# Patient Record
Sex: FEMALE | Employment: FULL TIME | URBAN - METROPOLITAN AREA
[De-identification: names, ages, dates, MRNs, and addresses within clinical notes are randomized per-mention and may not be internally consistent; named-entity substitution may affect disease eponyms.]

---

## 2017-04-07 ENCOUNTER — HOSPITAL ENCOUNTER (OUTPATIENT)
Dept: PULMONOLOGY | Age: 57
Discharge: HOME | End: 2017-04-07
Attending: ALLERGY & IMMUNOLOGY
Payer: COMMERCIAL

## 2017-04-07 VITALS — OXYGEN SATURATION: 97 %

## 2017-04-07 DIAGNOSIS — R06.2 WHEEZING: ICD-10-CM

## 2017-04-07 DIAGNOSIS — R05.9 COUGH: ICD-10-CM

## 2017-04-07 PROCEDURE — 94640 AIRWAY INHALATION TREATMENT: CPT

## 2017-04-07 PROCEDURE — 94070 EVALUATION OF WHEEZING: CPT

## 2017-04-07 PROCEDURE — 74011000250 HC RX REV CODE- 250

## 2017-04-07 PROCEDURE — 74011250636 HC RX REV CODE- 250/636: Performed by: FAMILY MEDICINE

## 2017-04-07 PROCEDURE — 74011000250 HC RX REV CODE- 250: Performed by: FAMILY MEDICINE

## 2017-04-07 RX ORDER — ALBUTEROL SULFATE 0.83 MG/ML
SOLUTION RESPIRATORY (INHALATION)
Status: COMPLETED
Start: 2017-04-07 | End: 2017-04-07

## 2017-04-07 RX ORDER — ALBUTEROL SULFATE 0.83 MG/ML
2.5 SOLUTION RESPIRATORY (INHALATION) ONCE
Status: COMPLETED | OUTPATIENT
Start: 2017-04-07 | End: 2017-04-07

## 2017-04-07 RX ADMIN — METHACHOLINE CHLORIDE 0.03 MG: 100 POWDER, FOR SOLUTION RESPIRATORY (INHALATION) at 14:49

## 2017-04-07 RX ADMIN — ALBUTEROL SULFATE 2.5 MG: 2.5 SOLUTION RESPIRATORY (INHALATION) at 15:13

## 2017-04-07 RX ADMIN — METHACHOLINE CHLORIDE 2.5 MG: 100 POWDER, FOR SOLUTION RESPIRATORY (INHALATION) at 14:58

## 2017-04-07 RX ADMIN — METHACHOLINE CHLORIDE 0.25 MG: 100 POWDER, FOR SOLUTION RESPIRATORY (INHALATION) at 14:53

## 2017-04-07 RX ADMIN — METHACHOLINE CHLORIDE 25 MG: 100 POWDER, FOR SOLUTION RESPIRATORY (INHALATION) at 15:11

## 2017-04-07 RX ADMIN — METHACHOLINE CHLORIDE 10 MG: 100 POWDER, FOR SOLUTION RESPIRATORY (INHALATION) at 15:02

## 2017-04-12 NOTE — PROCEDURES
[address]   [address], [address]   PULMONARY FUNCTION       Name:  Mich Stafnord   MR#:  [id number]   :  1960   Account #:  [de-identified]    Date of Procedure:  2017   Date of Adm:  2017       STUDY: Methacholine Challenge. REQUESTING PHYSICIAN: Dr. Huyen López. INDICATION: Asthma. FINDINGS: The patient received serial spirometry measurements after   receiving 5 successive escalating doses of methacholine. By ATS   criteria vital capacity and FEV1 did not increase by more than 20%. In   some aspects this could be considered a negative test and others it   could be considered borderline. Clinical correlation is recommended.          MD Ivan Greenwood / Jodie Shirley   D:  2017   16:04   T:  2017   23:01   Job #:  [id number]

## 2018-04-10 ENCOUNTER — HOSPITAL ENCOUNTER (OUTPATIENT)
Dept: CT IMAGING | Age: 58
Discharge: HOME | End: 2018-04-10
Attending: FAMILY MEDICINE
Payer: COMMERCIAL

## 2018-04-10 DIAGNOSIS — E27.9 ADRENAL HYPERTENSION (HCC): ICD-10-CM

## 2018-04-10 DIAGNOSIS — I15.2 ADRENAL HYPERTENSION (HCC): ICD-10-CM

## 2018-04-10 PROCEDURE — 74170 CT ABD WO CNTRST FLWD CNTRST: CPT

## 2018-04-10 PROCEDURE — 74011636320 HC RX REV CODE- 636/320: Performed by: RADIOLOGY

## 2018-04-10 RX ADMIN — IOPAMIDOL 95 ML: 755 INJECTION, SOLUTION INTRAVENOUS at 09:03

## 2020-07-27 ENCOUNTER — HOSPITAL ENCOUNTER (OUTPATIENT)
Dept: PREADMISSION TESTING | Age: 60
Discharge: HOME | End: 2020-07-27
Payer: COMMERCIAL

## 2020-07-27 VITALS
DIASTOLIC BLOOD PRESSURE: 95 MMHG | BODY MASS INDEX: 31.04 KG/M2 | HEIGHT: 67 IN | OXYGEN SATURATION: 97 % | SYSTOLIC BLOOD PRESSURE: 145 MMHG | WEIGHT: 197.75 LBS | RESPIRATION RATE: 20 BRPM | TEMPERATURE: 98.1 F | HEART RATE: 93 BPM

## 2020-07-27 LAB
25(OH)D3 SERPL-MCNC: 26.7 NG/ML (ref 30–100)
ABO + RH BLD: NORMAL
ALBUMIN SERPL-MCNC: 4 G/DL (ref 3.5–5)
ALBUMIN/GLOB SERPL: 1 {RATIO} (ref 1.1–2.2)
ALP SERPL-CCNC: 170 U/L (ref 45–117)
ALT SERPL-CCNC: 95 U/L (ref 12–78)
ANION GAP SERPL CALC-SCNC: 8 MMOL/L (ref 5–15)
APPEARANCE UR: CLEAR
APTT PPP: 28.2 SEC (ref 22.1–32)
AST SERPL-CCNC: 99 U/L (ref 15–37)
BACTERIA URNS QL MICRO: NEGATIVE /HPF
BASOPHILS # BLD: 0 K/UL (ref 0–0.1)
BASOPHILS NFR BLD: 0 % (ref 0–1)
BILIRUB SERPL-MCNC: 0.5 MG/DL (ref 0.2–1)
BILIRUB UR QL: NEGATIVE
BLOOD GROUP ANTIBODIES SERPL: NORMAL
BUN SERPL-MCNC: 5 MG/DL (ref 6–20)
BUN/CREAT SERPL: 8 (ref 12–20)
CALCIUM SERPL-MCNC: 9.1 MG/DL (ref 8.5–10.1)
CHLORIDE SERPL-SCNC: 93 MMOL/L (ref 97–108)
CO2 SERPL-SCNC: 29 MMOL/L (ref 21–32)
COLOR UR: NORMAL
CREAT SERPL-MCNC: 0.63 MG/DL (ref 0.55–1.02)
DIFFERENTIAL METHOD BLD: ABNORMAL
EOSINOPHIL # BLD: 0.1 K/UL (ref 0–0.4)
EOSINOPHIL NFR BLD: 1 % (ref 0–7)
EPITH CASTS URNS QL MICRO: NORMAL /LPF
ERYTHROCYTE [DISTWIDTH] IN BLOOD BY AUTOMATED COUNT: 11.8 % (ref 11.5–14.5)
EST. AVERAGE GLUCOSE BLD GHB EST-MCNC: 103 MG/DL
GLOBULIN SER CALC-MCNC: 3.9 G/DL (ref 2–4)
GLUCOSE SERPL-MCNC: 81 MG/DL (ref 65–100)
GLUCOSE UR STRIP.AUTO-MCNC: NEGATIVE MG/DL
HBA1C MFR BLD: 5.2 % (ref 4–5.6)
HCT VFR BLD AUTO: 38 % (ref 35–47)
HGB BLD-MCNC: 13.3 G/DL (ref 11.5–16)
HGB UR QL STRIP: NEGATIVE
HYALINE CASTS URNS QL MICRO: NORMAL /LPF (ref 0–5)
IMM GRANULOCYTES # BLD AUTO: 0 K/UL (ref 0–0.04)
IMM GRANULOCYTES NFR BLD AUTO: 0 % (ref 0–0.5)
INR PPP: 1 (ref 0.9–1.1)
KETONES UR QL STRIP.AUTO: NEGATIVE MG/DL
LEUKOCYTE ESTERASE UR QL STRIP.AUTO: NEGATIVE
LYMPHOCYTES # BLD: 1.7 K/UL (ref 0.8–3.5)
LYMPHOCYTES NFR BLD: 17 % (ref 12–49)
MCH RBC QN AUTO: 35.7 PG (ref 26–34)
MCHC RBC AUTO-ENTMCNC: 35 G/DL (ref 30–36.5)
MCV RBC AUTO: 101.9 FL (ref 80–99)
MONOCYTES # BLD: 0.8 K/UL (ref 0–1)
MONOCYTES NFR BLD: 8 % (ref 5–13)
NEUTS SEG # BLD: 7.4 K/UL (ref 1.8–8)
NEUTS SEG NFR BLD: 74 % (ref 32–75)
NITRITE UR QL STRIP.AUTO: NEGATIVE
NRBC # BLD: 0 K/UL (ref 0–0.01)
NRBC BLD-RTO: 0 PER 100 WBC
PH UR STRIP: 7.5 [PH] (ref 5–8)
PLATELET # BLD AUTO: 249 K/UL (ref 150–400)
PMV BLD AUTO: 9 FL (ref 8.9–12.9)
POTASSIUM SERPL-SCNC: 3.5 MMOL/L (ref 3.5–5.1)
PROT SERPL-MCNC: 7.9 G/DL (ref 6.4–8.2)
PROT UR STRIP-MCNC: NEGATIVE MG/DL
PROTHROMBIN TIME: 10.7 SEC (ref 9–11.1)
RBC # BLD AUTO: 3.73 M/UL (ref 3.8–5.2)
RBC #/AREA URNS HPF: NORMAL /HPF (ref 0–5)
SODIUM SERPL-SCNC: 130 MMOL/L (ref 136–145)
SP GR UR REFRACTOMETRY: 1.01 (ref 1–1.03)
SPECIMEN EXP DATE BLD: NORMAL
THERAPEUTIC RANGE,PTTT: NORMAL SECS (ref 58–77)
UA: UC IF INDICATED,UAUC: NORMAL
UROBILINOGEN UR QL STRIP.AUTO: 0.2 EU/DL (ref 0.2–1)
WBC # BLD AUTO: 10 K/UL (ref 3.6–11)
WBC URNS QL MICRO: NORMAL /HPF (ref 0–4)

## 2020-07-27 PROCEDURE — 85610 PROTHROMBIN TIME: CPT

## 2020-07-27 PROCEDURE — 80053 COMPREHEN METABOLIC PANEL: CPT

## 2020-07-27 PROCEDURE — 36415 COLL VENOUS BLD VENIPUNCTURE: CPT

## 2020-07-27 PROCEDURE — 86900 BLOOD TYPING SEROLOGIC ABO: CPT

## 2020-07-27 PROCEDURE — 85730 THROMBOPLASTIN TIME PARTIAL: CPT

## 2020-07-27 PROCEDURE — 93005 ELECTROCARDIOGRAM TRACING: CPT

## 2020-07-27 PROCEDURE — 82306 VITAMIN D 25 HYDROXY: CPT

## 2020-07-27 PROCEDURE — 85025 COMPLETE CBC W/AUTO DIFF WBC: CPT

## 2020-07-27 PROCEDURE — 83036 HEMOGLOBIN GLYCOSYLATED A1C: CPT

## 2020-07-27 PROCEDURE — 81001 URINALYSIS AUTO W/SCOPE: CPT

## 2020-07-27 RX ORDER — BUPROPION HYDROCHLORIDE 300 MG/1
300 TABLET ORAL
COMMUNITY

## 2020-07-27 RX ORDER — CITALOPRAM 40 MG/1
40 TABLET, FILM COATED ORAL
COMMUNITY

## 2020-07-27 RX ORDER — CYCLOBENZAPRINE HCL 10 MG
10 TABLET ORAL
Status: ON HOLD | COMMUNITY
End: 2020-08-04 | Stop reason: SDUPTHER

## 2020-07-27 RX ORDER — OMEPRAZOLE 20 MG/1
20 CAPSULE, DELAYED RELEASE ORAL
COMMUNITY
End: 2020-08-17

## 2020-07-27 RX ORDER — BISMUTH SUBSALICYLATE 262 MG
1 TABLET,CHEWABLE ORAL
COMMUNITY

## 2020-07-27 RX ORDER — LAMOTRIGINE 100 MG/1
100 TABLET ORAL
COMMUNITY

## 2020-07-27 RX ORDER — SIMVASTATIN 40 MG/1
40 TABLET, FILM COATED ORAL
COMMUNITY

## 2020-07-27 RX ORDER — CELECOXIB 200 MG/1
200 CAPSULE ORAL DAILY
COMMUNITY
End: 2020-08-04

## 2020-07-27 RX ORDER — NEBIVOLOL 5 MG/1
5 TABLET ORAL
COMMUNITY

## 2020-07-27 RX ORDER — LISINOPRIL AND HYDROCHLOROTHIAZIDE 20; 25 MG/1; MG/1
1 TABLET ORAL
COMMUNITY
End: 2020-08-18

## 2020-07-27 NOTE — PERIOP NOTES
[address] , [address] [facility]   MAIN OR                                  [phone]   MAIN PRE OP                          [phone]                                                                                AMBULATORY PRE OP          [phone]  PRE-ADMISSION TESTING    [phone]   Surgery Date:  Monday 8/3/20       Is surgery arrival time given by surgeon? NO  If NO, [facility] staff will call you between 3 and 7pm the day before your surgery with your arrival time. (If your surgery is on a Monday, we will call you the Friday before.)    Call [phone] after 7pm Monday-Friday if you did not receive this call. INSTRUCTIONS BEFORE YOUR SURGERY   When You  Arrive Arrive at the 2nd [address] [facility] on the day of your surgery  Have your insurance card, photo ID, and any copayment (if needed)   Food   and   Drink NO food or drink after midnight the night before surgery    This means NO water, gum, mints, coffee, juice, etc.  No alcohol (beer, wine, liquor) 24 hours before and after surgery   Medications to   TAKE   Morning of Surgery MEDICATIONS TO TAKE THE MORNING OF SURGERY WITH A SIP OF WATER:    Bupropion, citalopram, lamictal   Medications  To  STOP      7 days before surgery  Non-Steroidal anti-inflammatory Drugs (NSAID's): for example, Ibuprofen (Advil, Motrin), Naproxen (Aleve)   Aspirin, if taking for pain    Herbal supplements, vitamins, and fish oil   Other:  (Pain medications not listed above, including Tylenol may be taken)   Blood  Thinners     Bathing Clothing  Jewelry  Valuables      If you shower the morning of surgery, please do not apply anything to your skin (lotions, powders, deodorant, or makeup, especially mascara)   Follow Chlorhexidine Care Fusion body wash instructions provided to you during PAT appointment. Begin 3 days prior to surgery.    Do not shave or trim anywhere 24 hours before surgery   Wear your hair loose or down; no pony-tails, buns, or metal hair clips   Wear loose, comfortable, clean clothes   Wear glasses instead of contacts   Leave money, valuables, and jewelry, including body piercings, at home   Going Home - or Spending the Night  SAME-DAY SURGERY: You must have a responsible adult drive you home and stay with you 24 hours after surgery   ADMITS: If your doctor is keeping you in the hospital after surgery, leave personal belongings/luggage in your car until you have a hospital room number. Hospital discharge time is 12 noon  Drivers must be here before 12 noon unless you are told differently   Special Instructions It is now mandated that all surgical patients be tested for COVID-19 prior to surgery. Testing has to be exactly 4 days prior to surgery. Your COVID test date is Thursday 7/30/20 between 8:00 am and 11:00 am.       COVID testing will be performed curbside at the [address] [place] [address] Dr armstrong. There will be signs leading you to the testing site. You will need to bring a photo ID with you to be swabbed. Patients are advised to self-quarantine at home after testing and prior to your surgery date. You will be notified if your results are positive. What to watch for:   Coronavirus (COVID-19) affects different people in different ways   It also appears with a wide range of symptoms from mild to severe   Signs usually appear 2-14 days after exposure     If you develop any of the following, notify your doctor immediately:  o Fever  o Chills, with or without a shiver  o Muscle pain  o Headache  o Sore throat  o Dry cough  o New loss of taste or smell  o Tiredness      If you develop any of the following, call 911:  o Shortness of breath  o Difficulty breathing  o Chest pain  o New confusion  o Blueness of fingers and/or lips    16.   Special Instructions:  · Use Chlorhexidine Care Fusion wash and sponges 3 days prior to surgery as instructed. · Incentive spirometer given with instructions to practice at home and bring back to the hospital on the day of surgery. · [facility] will contact you if your Hemoglobin A1C is greater than 7.5. · Ensure/Glucerna  sample  and Ensure/Glucerna coupon given. · Pain pamphlet and Call Don't Fall reminder reviewed with patient. ·   · Bring PTA Medication list day of surgery with the last doses taken documented   · Do not bring medication bottles the day of surgery     Follow all instructions so your surgery wont be cancelled. Please, be on time. If a situation occurs and you are delayed the day of surgery, call [phone] or [phone]. If your physical condition changes (like a fever, cold, flu, etc.) call your surgeon. Home medication(s) reviewed and verified via      LIST   VERBAL   during PAT appointment. The patient was contacted by      IN-PERSON  The patient verbalizes understanding of all instructions and      DOES NOT   need reinforcement.

## 2020-07-28 LAB
BACTERIA SPEC CULT: NORMAL
BACTERIA SPEC CULT: NORMAL
SERVICE CMNT-IMP: NORMAL

## 2020-07-28 RX ORDER — CHOLECALCIFEROL TAB 125 MCG (5000 UNIT) 125 MCG
5000 TAB ORAL DAILY
Qty: 30 TAB | Refills: 0 | Status: SHIPPED | OUTPATIENT
Start: 2020-07-28 | End: 2020-08-27
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
## 2020-07-29 LAB
ATRIAL RATE: 92 BPM
CALCULATED P AXIS, ECG09: 37 DEGREES
CALCULATED R AXIS, ECG10: -3 DEGREES
CALCULATED T AXIS, ECG11: 20 DEGREES
DIAGNOSIS, 93000: NORMAL
P-R INTERVAL, ECG05: 158 MS
Q-T INTERVAL, ECG07: 392 MS
QRS DURATION, ECG06: 92 MS
QTC CALCULATION (BEZET), ECG08: 484 MS
VENTRICULAR RATE, ECG03: 92 BPM

## 2020-07-30 ENCOUNTER — HOSPITAL ENCOUNTER (OUTPATIENT)
Dept: PREADMISSION TESTING | Age: 60
Discharge: HOME | End: 2020-07-30
Payer: COMMERCIAL

## 2020-07-30 PROCEDURE — 87635 SARS-COV-2 COVID-19 AMP PRB: CPT

## 2020-07-31 ENCOUNTER — ANESTHESIA EVENT (OUTPATIENT)
Dept: SURGERY | Age: 60
End: 2020-07-31
Payer: COMMERCIAL

## 2020-07-31 LAB — SARS-COV-2, COV2NT: NOT DETECTED

## 2020-08-03 ENCOUNTER — HOSPITAL ENCOUNTER (OUTPATIENT)
Age: 60
Discharge: HOME | End: 2020-08-04
Attending: ORTHOPAEDIC SURGERY | Admitting: ORTHOPAEDIC SURGERY
Payer: COMMERCIAL

## 2020-08-03 ENCOUNTER — ANESTHESIA (OUTPATIENT)
Dept: SURGERY | Age: 60
End: 2020-08-03
Payer: COMMERCIAL

## 2020-08-03 ENCOUNTER — APPOINTMENT (OUTPATIENT)
Dept: GENERAL RADIOLOGY | Age: 60
End: 2020-08-03
Attending: ORTHOPAEDIC SURGERY
Payer: COMMERCIAL

## 2020-08-03 DIAGNOSIS — G89.18 POST-OP PAIN: Primary | ICD-10-CM

## 2020-08-03 PROBLEM — M48.02 CERVICAL STENOSIS OF SPINAL CANAL: Status: ACTIVE | Noted: 2020-08-03

## 2020-08-03 PROCEDURE — 74011250637 HC RX REV CODE- 250/637: Performed by: ORTHOPAEDIC SURGERY

## 2020-08-03 PROCEDURE — 77030011640 HC PAD GRND REM COVD -A: Performed by: ORTHOPAEDIC SURGERY

## 2020-08-03 PROCEDURE — 2709999900 HC NON-CHARGEABLE SUPPLY: Performed by: ORTHOPAEDIC SURGERY

## 2020-08-03 PROCEDURE — 77030040356 HC CORD BPLR FRCP COVD -A: Performed by: ORTHOPAEDIC SURGERY

## 2020-08-03 PROCEDURE — 77030030102 HC BIT DRL PYRNES K2M -B: Performed by: ORTHOPAEDIC SURGERY

## 2020-08-03 PROCEDURE — 74011250636 HC RX REV CODE- 250/636: Performed by: ORTHOPAEDIC SURGERY

## 2020-08-03 PROCEDURE — 77030018836 HC SOL IRR NACL ICUM -A: Performed by: ORTHOPAEDIC SURGERY

## 2020-08-03 PROCEDURE — 74011250636 HC RX REV CODE- 250/636: Performed by: ANESTHESIOLOGY

## 2020-08-03 PROCEDURE — 77030008684 HC TU ET CUF COVD -B: Performed by: ANESTHESIOLOGY

## 2020-08-03 PROCEDURE — 77030003666 HC NDL SPINAL BD -A: Performed by: ORTHOPAEDIC SURGERY

## 2020-08-03 PROCEDURE — 77030026438 HC STYL ET INTUB CARD -A: Performed by: ANESTHESIOLOGY

## 2020-08-03 PROCEDURE — 77030011267 HC ELECTRD BLD COVD -A: Performed by: ORTHOPAEDIC SURGERY

## 2020-08-03 PROCEDURE — 74011250636 HC RX REV CODE- 250/636: Performed by: NURSE ANESTHETIST, CERTIFIED REGISTERED

## 2020-08-03 PROCEDURE — 77030038692 HC WND DEB SYS IRMX -B: Performed by: ORTHOPAEDIC SURGERY

## 2020-08-03 PROCEDURE — 77030004391 HC BUR FLUT MEDT -C: Performed by: ORTHOPAEDIC SURGERY

## 2020-08-03 PROCEDURE — 76010000171 HC OR TIME 2 TO 2.5 HR INTENSV-TIER 1: Performed by: ORTHOPAEDIC SURGERY

## 2020-08-03 PROCEDURE — 99218 HC RM OBSERVATION: CPT

## 2020-08-03 PROCEDURE — 77030028271 HC SRGFL HEMSTAT MTRX KT J&J -C: Performed by: ORTHOPAEDIC SURGERY

## 2020-08-03 PROCEDURE — 76060000035 HC ANESTHESIA 2 TO 2.5 HR: Performed by: ORTHOPAEDIC SURGERY

## 2020-08-03 PROCEDURE — 77030031139 HC SUT VCRL2 J&J -A: Performed by: ORTHOPAEDIC SURGERY

## 2020-08-03 PROCEDURE — 77030037302 HC SPCR CERV LORDTC INLC -G: Performed by: ORTHOPAEDIC SURGERY

## 2020-08-03 PROCEDURE — 74011000250 HC RX REV CODE- 250: Performed by: NURSE ANESTHETIST, CERTIFIED REGISTERED

## 2020-08-03 PROCEDURE — 77030038552 HC DRN WND MDII -A: Performed by: ORTHOPAEDIC SURGERY

## 2020-08-03 PROCEDURE — 77030029099 HC BN WAX SSPC -A: Performed by: ORTHOPAEDIC SURGERY

## 2020-08-03 PROCEDURE — 77030040361 HC SLV COMPR DVT MDII -B

## 2020-08-03 PROCEDURE — C1713 ANCHOR/SCREW BN/BN,TIS/BN: HCPCS | Performed by: ORTHOPAEDIC SURGERY

## 2020-08-03 PROCEDURE — 77030013079 HC BLNKT BAIR HGGR 3M -A: Performed by: ANESTHESIOLOGY

## 2020-08-03 PROCEDURE — 74011000250 HC RX REV CODE- 250: Performed by: ORTHOPAEDIC SURGERY

## 2020-08-03 PROCEDURE — 76210000000 HC OR PH I REC 2 TO 2.5 HR: Performed by: ORTHOPAEDIC SURGERY

## 2020-08-03 PROCEDURE — 77030040922 HC BLNKT HYPOTHRM STRY -A

## 2020-08-03 PROCEDURE — 77030002933 HC SUT MCRYL J&J -A: Performed by: ORTHOPAEDIC SURGERY

## 2020-08-03 PROCEDURE — 77030018846 HC SOL IRR STRL H20 ICUM -A: Performed by: ORTHOPAEDIC SURGERY

## 2020-08-03 DEVICE — SPACER SPNL L15XW13XH9MM ANT CERV INTBDY FUS LORD CAPISTRANO: Type: IMPLANTABLE DEVICE | Site: SPINE CERVICAL | Status: FUNCTIONAL

## 2020-08-03 DEVICE — PLATE SPNL L36MM BILAT ANTR CERV TI 2 LEV CONSTRN LO PROF: Type: IMPLANTABLE DEVICE | Site: SPINE CERVICAL | Status: FUNCTIONAL

## 2020-08-03 DEVICE — SCREW SPNL L14MM DIA4MM CERV ST CONSTRN PYRENEES: Type: IMPLANTABLE DEVICE | Site: SPINE CERVICAL | Status: FUNCTIONAL

## 2020-08-03 DEVICE — IMPLANTABLE DEVICE: Type: IMPLANTABLE DEVICE | Site: SPINE CERVICAL | Status: FUNCTIONAL

## 2020-08-03 DEVICE — SCREW SPNL L12MM DIA4MM CERV ST CONSTRN LO PROF TIFIX LCK: Type: IMPLANTABLE DEVICE | Site: SPINE CERVICAL | Status: FUNCTIONAL

## 2020-08-03 RX ORDER — KETAMINE HYDROCHLORIDE 10 MG/ML
INJECTION, SOLUTION INTRAMUSCULAR; INTRAVENOUS AS NEEDED
Status: DISCONTINUED | OUTPATIENT
Start: 2020-08-03 | End: 2020-08-03 | Stop reason: HOSPADM

## 2020-08-03 RX ORDER — ACETAMINOPHEN 325 MG/1
650 TABLET ORAL
Status: DISCONTINUED | OUTPATIENT
Start: 2020-08-03 | End: 2020-08-04 | Stop reason: HOSPADM

## 2020-08-03 RX ORDER — ATORVASTATIN CALCIUM 20 MG/1
20 TABLET, FILM COATED ORAL DAILY
Status: DISCONTINUED | OUTPATIENT
Start: 2020-08-03 | End: 2020-08-04 | Stop reason: HOSPADM

## 2020-08-03 RX ORDER — DEXAMETHASONE SODIUM PHOSPHATE 4 MG/ML
INJECTION, SOLUTION INTRA-ARTICULAR; INTRALESIONAL; INTRAMUSCULAR; INTRAVENOUS; SOFT TISSUE AS NEEDED
Status: DISCONTINUED | OUTPATIENT
Start: 2020-08-03 | End: 2020-08-03 | Stop reason: HOSPADM

## 2020-08-03 RX ORDER — HYDROMORPHONE HCL/0.9% NACL/PF 0.5 MG/ML
PLASTIC BAG, INJECTION (ML) INTRAVENOUS
Status: DISCONTINUED | OUTPATIENT
Start: 2020-08-03 | End: 2020-08-04 | Stop reason: HOSPADM

## 2020-08-03 RX ORDER — OXYCODONE HYDROCHLORIDE 5 MG/1
5 TABLET ORAL
Status: DISCONTINUED | OUTPATIENT
Start: 2020-08-03 | End: 2020-08-04 | Stop reason: HOSPADM

## 2020-08-03 RX ORDER — HYDROMORPHONE HYDROCHLORIDE 2 MG/ML
INJECTION, SOLUTION INTRAMUSCULAR; INTRAVENOUS; SUBCUTANEOUS AS NEEDED
Status: DISCONTINUED | OUTPATIENT
Start: 2020-08-03 | End: 2020-08-03 | Stop reason: HOSPADM

## 2020-08-03 RX ORDER — POLYETHYLENE GLYCOL 3350 17 G/17G
17 POWDER, FOR SOLUTION ORAL DAILY
Status: DISCONTINUED | OUTPATIENT
Start: 2020-08-04 | End: 2020-08-04 | Stop reason: HOSPADM

## 2020-08-03 RX ORDER — PROPOFOL 10 MG/ML
INJECTION, EMULSION INTRAVENOUS
Status: DISCONTINUED | OUTPATIENT
Start: 2020-08-03 | End: 2020-08-03 | Stop reason: HOSPADM

## 2020-08-03 RX ORDER — PROPOFOL 10 MG/ML
INJECTION, EMULSION INTRAVENOUS AS NEEDED
Status: DISCONTINUED | OUTPATIENT
Start: 2020-08-03 | End: 2020-08-03 | Stop reason: HOSPADM

## 2020-08-03 RX ORDER — LAMOTRIGINE 100 MG/1
100 TABLET ORAL DAILY
Status: DISCONTINUED | OUTPATIENT
Start: 2020-08-04 | End: 2020-08-04 | Stop reason: HOSPADM

## 2020-08-03 RX ORDER — SUCCINYLCHOLINE CHLORIDE 20 MG/ML
INJECTION INTRAMUSCULAR; INTRAVENOUS AS NEEDED
Status: DISCONTINUED | OUTPATIENT
Start: 2020-08-03 | End: 2020-08-03 | Stop reason: HOSPADM

## 2020-08-03 RX ORDER — MIDAZOLAM HYDROCHLORIDE 1 MG/ML
INJECTION, SOLUTION INTRAMUSCULAR; INTRAVENOUS AS NEEDED
Status: DISCONTINUED | OUTPATIENT
Start: 2020-08-03 | End: 2020-08-03 | Stop reason: HOSPADM

## 2020-08-03 RX ORDER — PANTOPRAZOLE SODIUM 40 MG/1
40 TABLET, DELAYED RELEASE ORAL
Status: DISCONTINUED | OUTPATIENT
Start: 2020-08-04 | End: 2020-08-04 | Stop reason: HOSPADM

## 2020-08-03 RX ORDER — PHENYLEPHRINE HCL IN 0.9% NACL 0.4MG/10ML
SYRINGE (ML) INTRAVENOUS AS NEEDED
Status: DISCONTINUED | OUTPATIENT
Start: 2020-08-03 | End: 2020-08-03 | Stop reason: HOSPADM

## 2020-08-03 RX ORDER — SODIUM CHLORIDE 0.9 % (FLUSH) 0.9 %
5-40 SYRINGE (ML) INJECTION AS NEEDED
Status: DISCONTINUED | OUTPATIENT
Start: 2020-08-03 | End: 2020-08-04 | Stop reason: HOSPADM

## 2020-08-03 RX ORDER — ONDANSETRON 2 MG/ML
INJECTION INTRAMUSCULAR; INTRAVENOUS AS NEEDED
Status: DISCONTINUED | OUTPATIENT
Start: 2020-08-03 | End: 2020-08-03 | Stop reason: HOSPADM

## 2020-08-03 RX ORDER — CITALOPRAM 20 MG/1
40 TABLET, FILM COATED ORAL DAILY
Status: DISCONTINUED | OUTPATIENT
Start: 2020-08-04 | End: 2020-08-04 | Stop reason: HOSPADM

## 2020-08-03 RX ORDER — LIDOCAINE HYDROCHLORIDE 20 MG/ML
INJECTION, SOLUTION EPIDURAL; INFILTRATION; INTRACAUDAL; PERINEURAL AS NEEDED
Status: DISCONTINUED | OUTPATIENT
Start: 2020-08-03 | End: 2020-08-03 | Stop reason: HOSPADM

## 2020-08-03 RX ORDER — FACIAL-BODY WIPES
10 EACH TOPICAL DAILY PRN
Status: DISCONTINUED | OUTPATIENT
Start: 2020-08-05 | End: 2020-08-04 | Stop reason: HOSPADM

## 2020-08-03 RX ORDER — HYDROMORPHONE HYDROCHLORIDE 1 MG/ML
0.5 INJECTION, SOLUTION INTRAMUSCULAR; INTRAVENOUS; SUBCUTANEOUS
Status: DISCONTINUED | OUTPATIENT
Start: 2020-08-03 | End: 2020-08-04 | Stop reason: HOSPADM

## 2020-08-03 RX ORDER — SODIUM CHLORIDE 0.9 % (FLUSH) 0.9 %
5-40 SYRINGE (ML) INJECTION EVERY 8 HOURS
Status: DISCONTINUED | OUTPATIENT
Start: 2020-08-03 | End: 2020-08-04 | Stop reason: HOSPADM

## 2020-08-03 RX ORDER — BUPROPION HYDROCHLORIDE 150 MG/1
300 TABLET ORAL DAILY
Status: DISCONTINUED | OUTPATIENT
Start: 2020-08-04 | End: 2020-08-04 | Stop reason: HOSPADM

## 2020-08-03 RX ORDER — SODIUM CHLORIDE, SODIUM LACTATE, POTASSIUM CHLORIDE, CALCIUM CHLORIDE 600; 310; 30; 20 MG/100ML; MG/100ML; MG/100ML; MG/100ML
150 INJECTION, SOLUTION INTRAVENOUS CONTINUOUS
Status: DISCONTINUED | OUTPATIENT
Start: 2020-08-03 | End: 2020-08-03 | Stop reason: HOSPADM

## 2020-08-03 RX ORDER — ONDANSETRON 2 MG/ML
4 INJECTION INTRAMUSCULAR; INTRAVENOUS AS NEEDED
Status: DISCONTINUED | OUTPATIENT
Start: 2020-08-03 | End: 2020-08-03 | Stop reason: HOSPADM

## 2020-08-03 RX ORDER — CYCLOBENZAPRINE HCL 10 MG
10 TABLET ORAL
Status: DISCONTINUED | OUTPATIENT
Start: 2020-08-03 | End: 2020-08-04 | Stop reason: HOSPADM

## 2020-08-03 RX ORDER — DIPHENHYDRAMINE HYDROCHLORIDE 50 MG/ML
12.5 INJECTION, SOLUTION INTRAMUSCULAR; INTRAVENOUS
Status: DISCONTINUED | OUTPATIENT
Start: 2020-08-03 | End: 2020-08-04 | Stop reason: HOSPADM

## 2020-08-03 RX ORDER — ROCURONIUM BROMIDE 10 MG/ML
INJECTION, SOLUTION INTRAVENOUS AS NEEDED
Status: DISCONTINUED | OUTPATIENT
Start: 2020-08-03 | End: 2020-08-03 | Stop reason: HOSPADM

## 2020-08-03 RX ORDER — OXYCODONE HYDROCHLORIDE 5 MG/1
10 TABLET ORAL
Status: DISCONTINUED | OUTPATIENT
Start: 2020-08-03 | End: 2020-08-04 | Stop reason: HOSPADM

## 2020-08-03 RX ORDER — HYDROMORPHONE HYDROCHLORIDE 1 MG/ML
0.5 INJECTION, SOLUTION INTRAMUSCULAR; INTRAVENOUS; SUBCUTANEOUS
Status: DISPENSED | OUTPATIENT
Start: 2020-08-03 | End: 2020-08-03

## 2020-08-03 RX ORDER — FENTANYL CITRATE 50 UG/ML
INJECTION, SOLUTION INTRAMUSCULAR; INTRAVENOUS AS NEEDED
Status: DISCONTINUED | OUTPATIENT
Start: 2020-08-03 | End: 2020-08-03 | Stop reason: HOSPADM

## 2020-08-03 RX ORDER — SODIUM CHLORIDE 9 MG/ML
125 INJECTION, SOLUTION INTRAVENOUS CONTINUOUS
Status: DISCONTINUED | OUTPATIENT
Start: 2020-08-03 | End: 2020-08-04 | Stop reason: HOSPADM

## 2020-08-03 RX ORDER — SODIUM CHLORIDE, SODIUM LACTATE, POTASSIUM CHLORIDE, CALCIUM CHLORIDE 600; 310; 30; 20 MG/100ML; MG/100ML; MG/100ML; MG/100ML
125 INJECTION, SOLUTION INTRAVENOUS CONTINUOUS
Status: DISCONTINUED | OUTPATIENT
Start: 2020-08-03 | End: 2020-08-03 | Stop reason: HOSPADM

## 2020-08-03 RX ORDER — NALOXONE HYDROCHLORIDE 0.4 MG/ML
0.4 INJECTION, SOLUTION INTRAMUSCULAR; INTRAVENOUS; SUBCUTANEOUS AS NEEDED
Status: DISCONTINUED | OUTPATIENT
Start: 2020-08-03 | End: 2020-08-04 | Stop reason: HOSPADM

## 2020-08-03 RX ORDER — EPHEDRINE SULFATE/0.9% NACL/PF 50 MG/5 ML
SYRINGE (ML) INTRAVENOUS AS NEEDED
Status: DISCONTINUED | OUTPATIENT
Start: 2020-08-03 | End: 2020-08-03 | Stop reason: HOSPADM

## 2020-08-03 RX ORDER — SODIUM CHLORIDE, SODIUM LACTATE, POTASSIUM CHLORIDE, CALCIUM CHLORIDE 600; 310; 30; 20 MG/100ML; MG/100ML; MG/100ML; MG/100ML
INJECTION, SOLUTION INTRAVENOUS
Status: DISCONTINUED | OUTPATIENT
Start: 2020-08-03 | End: 2020-08-03 | Stop reason: HOSPADM

## 2020-08-03 RX ORDER — ALBUTEROL SULFATE 0.83 MG/ML
2.5 SOLUTION RESPIRATORY (INHALATION) AS NEEDED
Status: DISCONTINUED | OUTPATIENT
Start: 2020-08-03 | End: 2020-08-03 | Stop reason: HOSPADM

## 2020-08-03 RX ORDER — ONDANSETRON 2 MG/ML
4 INJECTION INTRAMUSCULAR; INTRAVENOUS
Status: DISCONTINUED | OUTPATIENT
Start: 2020-08-03 | End: 2020-08-04 | Stop reason: HOSPADM

## 2020-08-03 RX ORDER — LIDOCAINE HYDROCHLORIDE 10 MG/ML
0.1 INJECTION, SOLUTION EPIDURAL; INFILTRATION; INTRACAUDAL; PERINEURAL AS NEEDED
Status: DISCONTINUED | OUTPATIENT
Start: 2020-08-03 | End: 2020-08-03 | Stop reason: HOSPADM

## 2020-08-03 RX ORDER — DIPHENHYDRAMINE HYDROCHLORIDE 50 MG/ML
12.5 INJECTION, SOLUTION INTRAMUSCULAR; INTRAVENOUS AS NEEDED
Status: DISCONTINUED | OUTPATIENT
Start: 2020-08-03 | End: 2020-08-03 | Stop reason: HOSPADM

## 2020-08-03 RX ORDER — NEBIVOLOL 5 MG/1
5 TABLET ORAL
Status: DISCONTINUED | OUTPATIENT
Start: 2020-08-03 | End: 2020-08-04 | Stop reason: HOSPADM

## 2020-08-03 RX ORDER — CHOLECALCIFEROL TAB 125 MCG (5000 UNIT) 125 MCG
5000 TAB ORAL DAILY
Status: DISCONTINUED | OUTPATIENT
Start: 2020-08-04 | End: 2020-08-04 | Stop reason: HOSPADM

## 2020-08-03 RX ORDER — AMOXICILLIN 250 MG
1 CAPSULE ORAL 2 TIMES DAILY
Status: DISCONTINUED | OUTPATIENT
Start: 2020-08-04 | End: 2020-08-04 | Stop reason: HOSPADM

## 2020-08-03 RX ADMIN — ATORVASTATIN CALCIUM 20 MG: 20 TABLET, FILM COATED ORAL at 20:29

## 2020-08-03 RX ADMIN — HYDROMORPHONE HYDROCHLORIDE 0.5 MG: 2 INJECTION INTRAMUSCULAR; INTRAVENOUS; SUBCUTANEOUS at 14:32

## 2020-08-03 RX ADMIN — Medication 10 MG: at 14:20

## 2020-08-03 RX ADMIN — HYDROMORPHONE HYDROCHLORIDE 0.5 MG: 2 INJECTION INTRAMUSCULAR; INTRAVENOUS; SUBCUTANEOUS at 14:37

## 2020-08-03 RX ADMIN — CYCLOBENZAPRINE 10 MG: 10 TABLET, FILM COATED ORAL at 20:29

## 2020-08-03 RX ADMIN — Medication 100 MCG: at 14:20

## 2020-08-03 RX ADMIN — MIDAZOLAM HYDROCHLORIDE 2 MG: 2 INJECTION, SOLUTION INTRAMUSCULAR; INTRAVENOUS at 13:58

## 2020-08-03 RX ADMIN — ONDANSETRON HYDROCHLORIDE 4 MG: 2 SOLUTION INTRAMUSCULAR; INTRAVENOUS at 14:16

## 2020-08-03 RX ADMIN — SODIUM CHLORIDE, SODIUM LACTATE, POTASSIUM CHLORIDE, AND CALCIUM CHLORIDE 150 ML/HR: 600; 310; 30; 20 INJECTION, SOLUTION INTRAVENOUS at 13:05

## 2020-08-03 RX ADMIN — Medication 100 MCG: at 15:00

## 2020-08-03 RX ADMIN — CEFAZOLIN SODIUM 2 G: 1 POWDER, FOR SOLUTION INTRAMUSCULAR; INTRAVENOUS at 14:10

## 2020-08-03 RX ADMIN — PROPOFOL 200 MG: 10 INJECTION, EMULSION INTRAVENOUS at 14:02

## 2020-08-03 RX ADMIN — Medication 100 MCG: at 14:51

## 2020-08-03 RX ADMIN — SODIUM CHLORIDE 125 ML/HR: 900 INJECTION, SOLUTION INTRAVENOUS at 20:09

## 2020-08-03 RX ADMIN — SUCCINYLCHOLINE CHLORIDE 100 MG: 20 INJECTION, SOLUTION INTRAMUSCULAR; INTRAVENOUS; PARENTERAL at 14:02

## 2020-08-03 RX ADMIN — HYDROMORPHONE HYDROCHLORIDE 0.5 MG: 1 INJECTION, SOLUTION INTRAMUSCULAR; INTRAVENOUS; SUBCUTANEOUS at 17:10

## 2020-08-03 RX ADMIN — ROCURONIUM BROMIDE 5 MG: 10 INJECTION, SOLUTION INTRAVENOUS at 14:02

## 2020-08-03 RX ADMIN — HYDROMORPHONE HYDROCHLORIDE 0.5 MG: 1 INJECTION, SOLUTION INTRAMUSCULAR; INTRAVENOUS; SUBCUTANEOUS at 16:52

## 2020-08-03 RX ADMIN — FENTANYL CITRATE 100 MCG: 0.05 INJECTION, SOLUTION INTRAMUSCULAR; INTRAVENOUS at 14:02

## 2020-08-03 RX ADMIN — PROPOFOL 100 MCG/KG/MIN: 10 INJECTION, EMULSION INTRAVENOUS at 14:16

## 2020-08-03 RX ADMIN — NEBIVOLOL HYDROCHLORIDE 5 MG: 5 TABLET ORAL at 20:40

## 2020-08-03 RX ADMIN — SODIUM CHLORIDE, POTASSIUM CHLORIDE, SODIUM LACTATE AND CALCIUM CHLORIDE: 600; 310; 30; 20 INJECTION, SOLUTION INTRAVENOUS at 15:31

## 2020-08-03 RX ADMIN — Medication 20 MG: at 14:53

## 2020-08-03 RX ADMIN — LIDOCAINE HYDROCHLORIDE 80 MG: 20 INJECTION, SOLUTION EPIDURAL; INFILTRATION; INTRACAUDAL; PERINEURAL at 14:02

## 2020-08-03 RX ADMIN — WATER 2 G: 1 INJECTION INTRAMUSCULAR; INTRAVENOUS; SUBCUTANEOUS at 20:29

## 2020-08-03 RX ADMIN — Medication 100 MCG: at 15:14

## 2020-08-03 RX ADMIN — Medication: at 17:36

## 2020-08-03 RX ADMIN — Medication 12.5 MG: at 15:00

## 2020-08-03 RX ADMIN — DEXAMETHASONE SODIUM PHOSPHATE 10 MG: 4 INJECTION, SOLUTION INTRAMUSCULAR; INTRAVENOUS at 14:18

## 2020-08-03 RX ADMIN — Medication 10 ML: at 22:33

## 2020-08-03 RX ADMIN — KETAMINE HYDROCHLORIDE 30 MG: 10 INJECTION INTRAMUSCULAR; INTRAVENOUS at 14:39

## 2020-08-03 RX ADMIN — SODIUM CHLORIDE, POTASSIUM CHLORIDE, SODIUM LACTATE AND CALCIUM CHLORIDE: 600; 310; 30; 20 INJECTION, SOLUTION INTRAVENOUS at 13:09

## 2020-08-03 RX ADMIN — Medication 200 MCG: at 14:53

## 2020-08-03 NOTE — ANESTHESIA POSTPROCEDURE EVALUATION
Procedure(s):  C5-C7 ANTERIOR CERVICAL DISCECTOMY FUSION WITH INSTRUMENTATION. general    Anesthesia Post Evaluation      Multimodal analgesia: multimodal analgesia not used between 6 hours prior to anesthesia start to PACU discharge  Patient location during evaluation: PACU  Patient participation: complete - patient participated  Level of consciousness: awake and alert  Pain score: 3  Pain management: adequate  Airway patency: patent  Anesthetic complications: no  Cardiovascular status: hemodynamically stable and acceptable  Respiratory status: acceptable  Hydration status: acceptable  Comments: Patient seen and evaluated; no concerns. Post anesthesia nausea and vomiting:  none      INITIAL Post-op Vital signs:   Vitals Value Taken Time   /84 8/3/2020  5:25 PM   Temp 36.4 °C (97.6 °F) 8/3/2020  4:19 PM   Pulse 103 8/3/2020  5:26 PM   Resp 16 8/3/2020  5:26 PM   SpO2 95 % 8/3/2020  5:26 PM   Vitals shown include unvalidated device data.

## 2020-08-03 NOTE — ANESTHESIA PREPROCEDURE EVALUATION
Relevant Problems   No relevant active problems       Anesthetic History   No history of anesthetic complications            Review of Systems / Medical History  Patient summary reviewed and nursing notes reviewed    Pulmonary        Sleep apnea: No treatment           Neuro/Psych         Psychiatric history    Comments: Anxiety/depression  Bipolar disorder  Chronic insomnia  PAIN = 5/10   Neck  Cardiovascular    Hypertension: well controlled          Hyperlipidemia    Exercise tolerance: >4 METS     GI/Hepatic/Renal                Endo/Other        Obesity and arthritis     Other Findings              Physical Exam    Airway  Mallampati: III    Neck ROM: normal range of motion   Mouth opening: Normal     Cardiovascular    Rhythm: regular  Rate: normal         Dental  No notable dental hx       Pulmonary  Breath sounds clear to auscultation               Abdominal         Other Findings            Anesthetic Plan    ASA: 3  Anesthesia type: general          Induction: Intravenous  Anesthetic plan and risks discussed with: Patient      Informed consent obtained.

## 2020-08-03 NOTE — OP NOTES
[address] [facility]  [address], [address] [facility]    OPERATIVE REPORT      NAME: Any Small    AGE: 64 y.o. YOB: 1960    MEDICAL RECORD NUMBER: [id number]    DATE OF SURGERY: 8/3/2020    OPERATIVE REPORT     PREOPERATIVE DIAGNOSIS: Cervical stenosis     POSTOPERATIVE DIAGNOSIS: Cervical stenosis    OPERATIVE PROCEDURE: C5 to C7 anterior cervical diskectomy and fusion with instrumentation and application of interbody spacer at C5-C6 and C6-C7. SURGEON: Cuauhtemoc Frazier MD     ASSISTANT: Delcie Opitz, PA    Specimens - no    ANESTHESIA: General    COMPLICATIONS: None    ESTIMATED BLOOD LOSS: 50 cc    INSTRUMENTATION: K2M plate, Seaspine spacer    NEUROMONITORING: SSEPs and spontaneous EMGs    INDICATION FOR PROCEDURE: The patient is a very pleasant 64 y.o. female with cervical stenosis. The patient elected to proceed with operative intervention. She was aware of the risks, benefits, and alternatives. She provided informed consent. PROCEDURE: The patient was identified in the preoperative holding area. The anterior cervical spine was marked by me. She was transferred to the operating room where general anesthesia was given. She was also given perioperative ancef antibiotics. The patient was placed supine on the operating room table. All bony prominences were well-padded. The shoulders were taped. The anterior cervical spine was prepped and draped in the usual standard fashion. We performed a surgical time-out. I made a skin incision on the left side. It was transverse. I exposed the anterior cervical spine. I placed a needle into the disk space to verify our levels. I exposed the disc spaces with electrocautery from uncus to uncus. I brought in the operating room microscope. I performed a diskectomy at C5-C6. I decompressed the spinal cord and nerve roots bilaterally. I prepared the endplates to bleeding bone. We had good hemostasis.  I performed trial sizing. I placed a spacer into C5-C6 with the appropriate amount of tension and alignment. I performed an identical procedure at C6-C7. The endplates were prepared to bleeding bone. The spinal cord and nerve roots were decompressed. I placed a spacer into C6-C7 with the appropriate amount of tension and alignment. The spacers had allograft. I then placed an anterior cervical plate into C5, C6, and C7. The screws were locked to the plate according to the manufacture's specification. We had good hemostasis. I copiously irrigated the entire wound. I placed a deep drain. The wound was closed with 3-0 Vicryl and 4-0 Monocryl. A sterile dressing was applied. The patient was extubated and transferred to the recovery room in good medical condition. The PA assisted with retraction and wound closure    I, Dr. Shayna Givens, performed the above procedures.      Shayna Givens MD  8/3/2020

## 2020-08-03 NOTE — H&P
Date of Surgery Update:  Faviola Conn was seen and examined. History and physical has been reviewed. The patient has been examined.  There have been no significant clinical changes since the completion of the originally dated History and Physical.    Signed By: Laura Pugh MD     August 3, 2020 12:42 PM

## 2020-08-04 VITALS
HEART RATE: 94 BPM | RESPIRATION RATE: 18 BRPM | SYSTOLIC BLOOD PRESSURE: 129 MMHG | TEMPERATURE: 98.2 F | OXYGEN SATURATION: 95 % | DIASTOLIC BLOOD PRESSURE: 82 MMHG

## 2020-08-04 LAB
ANION GAP SERPL CALC-SCNC: 7 MMOL/L (ref 5–15)
BUN SERPL-MCNC: 6 MG/DL (ref 6–20)
BUN/CREAT SERPL: 7 (ref 12–20)
CALCIUM SERPL-MCNC: 8.3 MG/DL (ref 8.5–10.1)
CHLORIDE SERPL-SCNC: 101 MMOL/L (ref 97–108)
CO2 SERPL-SCNC: 27 MMOL/L (ref 21–32)
CREAT SERPL-MCNC: 0.85 MG/DL (ref 0.55–1.02)
GLUCOSE SERPL-MCNC: 115 MG/DL (ref 65–100)
HGB BLD-MCNC: 12.2 G/DL (ref 11.5–16)
POTASSIUM SERPL-SCNC: 3.8 MMOL/L (ref 3.5–5.1)
SODIUM SERPL-SCNC: 135 MMOL/L (ref 136–145)

## 2020-08-04 PROCEDURE — 36415 COLL VENOUS BLD VENIPUNCTURE: CPT

## 2020-08-04 PROCEDURE — 74011250636 HC RX REV CODE- 250/636: Performed by: ORTHOPAEDIC SURGERY

## 2020-08-04 PROCEDURE — 99218 HC RM OBSERVATION: CPT

## 2020-08-04 PROCEDURE — 80048 BASIC METABOLIC PNL TOTAL CA: CPT

## 2020-08-04 PROCEDURE — 85018 HEMOGLOBIN: CPT

## 2020-08-04 PROCEDURE — 74011000250 HC RX REV CODE- 250: Performed by: ORTHOPAEDIC SURGERY

## 2020-08-04 PROCEDURE — 74011250637 HC RX REV CODE- 250/637: Performed by: ORTHOPAEDIC SURGERY

## 2020-08-04 PROCEDURE — 94760 N-INVAS EAR/PLS OXIMETRY 1: CPT

## 2020-08-04 RX ORDER — OXYCODONE HYDROCHLORIDE 5 MG/1
5-10 TABLET ORAL
Qty: 50 TAB | Refills: 0 | Status: SHIPPED | OUTPATIENT
Start: 2020-08-04 | End: 2020-08-11

## 2020-08-04 RX ORDER — CYCLOBENZAPRINE HCL 10 MG
10 TABLET ORAL
Qty: 30 TAB | Refills: 0 | Status: ON HOLD | OUTPATIENT
Start: 2020-08-04 | End: 2020-08-18 | Stop reason: SDUPTHER

## 2020-08-04 RX ORDER — DOCUSATE SODIUM 100 MG/1
100 CAPSULE, LIQUID FILLED ORAL 2 TIMES DAILY
Qty: 60 CAP | Refills: 0 | Status: SHIPPED | OUTPATIENT
Start: 2020-08-04 | End: 2020-08-17

## 2020-08-04 RX ADMIN — OXYCODONE 10 MG: 5 TABLET ORAL at 10:00

## 2020-08-04 RX ADMIN — OXYCODONE 5 MG: 5 TABLET ORAL at 01:02

## 2020-08-04 RX ADMIN — POLYETHYLENE GLYCOL 3350 17 G: 17 POWDER, FOR SOLUTION ORAL at 10:00

## 2020-08-04 RX ADMIN — LAMOTRIGINE 100 MG: 100 TABLET ORAL at 09:59

## 2020-08-04 RX ADMIN — SODIUM CHLORIDE 125 ML/HR: 900 INJECTION, SOLUTION INTRAVENOUS at 01:04

## 2020-08-04 RX ADMIN — CHOLECALCIFEROL TAB 125 MCG (5000 UNIT) 5000 UNITS: 125 TAB at 09:59

## 2020-08-04 RX ADMIN — DOCUSATE SODIUM 50MG AND SENNOSIDES 8.6MG 1 TABLET: 8.6; 5 TABLET, FILM COATED ORAL at 10:00

## 2020-08-04 RX ADMIN — BUPROPION HYDROCHLORIDE 300 MG: 150 TABLET, FILM COATED, EXTENDED RELEASE ORAL at 10:00

## 2020-08-04 RX ADMIN — CITALOPRAM HYDROBROMIDE 40 MG: 20 TABLET ORAL at 09:59

## 2020-08-04 RX ADMIN — LISINOPRIL: 20 TABLET ORAL at 09:59

## 2020-08-04 RX ADMIN — PANTOPRAZOLE SODIUM 40 MG: 40 TABLET, DELAYED RELEASE ORAL at 06:57

## 2020-08-04 RX ADMIN — OXYCODONE 10 MG: 5 TABLET ORAL at 04:47

## 2020-08-04 RX ADMIN — WATER 2 G: 1 INJECTION INTRAMUSCULAR; INTRAVENOUS; SUBCUTANEOUS at 04:46

## 2020-08-04 RX ADMIN — Medication 10 ML: at 06:57

## 2020-08-04 NOTE — PROGRESS NOTES
Problem: Falls - Risk of  Goal: *Absence of Falls  Description: Document Juan Jones Fall Risk and appropriate interventions in the flowsheet.   Outcome: Progressing Towards Goal  Note: Fall Risk Interventions:  Mobility Interventions: Patient to call before getting OOB         Medication Interventions: Patient to call before getting OOB         History of Falls Interventions: Bed/chair exit alarm

## 2020-08-04 NOTE — PROGRESS NOTES
08/04/20 0816   Vital Signs   Temp 98.1 °F (36.7 °C)   Temp Source Oral   Pulse (Heart Rate) (!) 104   Heart Rate Source Monitor   Resp Rate 23   O2 Sat (%) 95 %   Level of Consciousness Alert   /85   MAP (Calculated) 107   BP 1 Method Automatic   BP 1 Location Left arm   BP Patient Position At rest;Post activity   MEWS Score 3   Oxygen Therapy   O2 Device Room air     Patient asked to use PCA FOR PAIN.  Will continue to monitor

## 2020-08-04 NOTE — PROGRESS NOTES
Scripts Escribed to pharmacy. Nurse handed  a copy of instructions . All medications and instructions have been read and explained to them.  Verbalized understanding

## 2020-08-04 NOTE — PERIOP NOTES
TRANSFER - OUT REPORT:    Verbal report given to [address] [place] [address] on Torri Barboza  being transferred to [facility] for routine post - op       Report consisted of patients Situation, Background, Assessment and   Recommendations(SBAR). Information from the following report(s) SBAR, OR Summary, Intake/Output and MAR was reviewed with the receiving nurse. Lines:   Peripheral IV 08/03/20 Right; Lower Forearm (Active)   Site Assessment Clean, dry, & intact 08/03/20 1947   Phlebitis Assessment 0 08/03/20 1947   Infiltration Assessment 0 08/03/20 1947   Dressing Status Clean, dry, & intact 08/03/20 1947   Dressing Type Transparent;Tape 08/03/20 1947   Hub Color/Line Status Patent; Infusing;Pink 08/03/20 1947   Alcohol Cap Used Yes 08/03/20 1947        Opportunity for questions and clarification was provided.       Patient transported with:   O2 @ 2 liters  Registered Nurse

## 2020-08-04 NOTE — DISCHARGE INSTRUCTIONS
El Austin MD  [facility]  Office Phone: [phone]  Neck Surgery Discharge Instructions  Activities   You are going home a well person, be as active as possible. Your only exercise should be walking. Start with short frequent walks and increase your walking distance each day. Start with walking twice a day for 5 minutes and increase your distance each day 2-3 minutes until you reach 25 minutes twice a day. Limit the amount of time you sit to 20-30 minute intervals. Sitting for prolonged periods of time will be uncomfortable for you following your surgery.  Do not lift anything over five pounds, and do not do any bending or straining.  Avoid reaching overhead in this post-operative period   Do not do any neck exercises until you have been instructed by your doctor.  When you are in the bed, you may lay on your back or on either side. Do not lie on your stomach.  Continue using your incentive spirometer regularly for deep breathing exercises   You may resume sexual relations 3-4 weeks after your surgery, depending on how you are feeling. Diet   You may resume your normal diet. If your throat is sore, you may want to eat soft foods for a few days. Be sure to drink plenty of fluids, it is important to keep yourself hydrated. If you begin having trouble swallowing, call the office immediately.  Avoid alcoholic beverages and ABSOLUTELY NO tobacco products. Tobacco products will interfere with your healing. If you continue to use tobacco, you may end up needing another surgery in the future. Medications   Do not take anti-inflammatory medications or aspirin unless instructed by your physician.  Take your pain medication as directed.  Do NOT take additional Tylenol if your prescribed pain medication has acetaminophen in it (Endocet/Percocet, Lortab, Norco).  It is important to have regular bowel movements. Pain medications may cause constipation.   Stool softeners, prune juice, and increasing your water and fiber intake may help in preventing constipation.  Do NOT take laxatives if at all possible except in severe situations. It can results in a vicious cycle of constipation and diarrhea.  Do not be alarmed if you still have some of the same symptoms you had prior to surgery. The nerves often require time to heal after the pressure has been relieved. You may experience pain in your shoulders or between your shoulder blades, which is common after this surgery. The level of pain you experience should improve as your body heals. *** VERY IMPORTANT - PLEASE READ*** Please monitor the supply of your pain medicine closely and if it looks like you're going to run out of pain medicine over the weekend please call the office [facility] prior to the weekend to request a refill. The on call physician for nights and weekends CANNOT refill pain medicine. You will either have to wait until Monday morning when the office re-opens or you will have to go to an urgent care/ emergency room if you are in need of pain medicine. Driving   You may not drive or return to work until instructed by your physician. However, you may ride in the car for short periods of time. Neck collar   Wear your neck brace. You may remove it for short breaks, when eating or showering. You must keep the brace on while sleeping and when ambulating. Showering   You may shower in approximately 5 days after your surgery if your incision is not draining.  You may remove your brace during showers.  Do not rub or apply lotion or ointments to the incision site.  Do not use tub baths, swimming pools or Jacuzzis. Caring for your incision   Keep the clear, plastic dressing on until 3 days after surgery. At that point, if the incision is dry and without drainage, you may keep the wound open to air without cover.     You may have steri-strips on your incision (small, white pieces of tape). Do not pull the steri-strips; they will fall off on their own after several days. If you have sutures or staples, they will be removed by home health or when you see your physician.  Do not rub or apply any lotions or ointments to your incision site. Follow Up   Once you are home, call your physicians office to schedule an appointment 2-3 weeks after surgery. Notify your physician if you develop any of the following conditions:   Fever above 101 degrees for 24 hours.  Nausea or vomiting.  Severe headache.  Inability to urinate.  Loss of bowel or bladder control (sudden onset of incontinence).  Changes in sensation in your extremities (numbness, tingling, loss of color).  Severe pain or pain not relieved by medications.  Redness, swelling, or drainage from your incision.  Persistent pain in the chest.    Pain in the calf of either leg.  Increased weakness (if this is greater than before your surgery). If you have any questions, contact your Orthopaedic Surgeons office. OFFICE OF DR. Christiane Wood   [phone]  OUR NEW [place]DRESS IS [id number] [place] [address], [address], [address], [address] [facility]     * WEAR YOUR BRACE AS ADVISED    * NO DRIVING UNTIL YOU ARE CLEARED TO DO SO BY YOUR SURGEON    * LIMIT LIFTING, BENDING AND TWISTING.  NO LIFTING MORE THAN 5 LBS    * MAKE SURE YOU ARE GETTING GOOD NUTRITION (Lean Protein, Vitamin D AND Calcium)    * DO NOT TAKE ANY NSAIDs FOR THE FIRST 3 MONTHS AFTER SURGERY (such as Advil/Ibuprofen/Motrin, Aleve/Naproxen/Naprosyn, Diclofenac, Celebrex, Meloxicam, Indomethacin, Goody's powder, BC powder etc.)    * NO NICOTINE PRODUCTS    * FULLY READ YOUR DISCHARGE INSTRUCTIONS

## 2020-08-04 NOTE — PROGRESS NOTES
Patient's drain was removed and steristrips removed as well due to being completely saturated and peeling. 2X2 placed over site with transparent dressing on top. Steristrip not replaced, NP informed and discharge RN informed.

## 2020-08-04 NOTE — PROGRESS NOTES
8/4/2020  Reason for Admission:   Cervical spondylosis with myeopathy                   RUR Score:          N/a patient is observation status           Plan for utilizing home health:      None at this time, patient has no history     PCP: First and Last name:   Rashel Swenson MD   Name of Practice:    Are you a current patient: Yes/No:  Yes    Approximate date of last visit:  February 2020   Can you participate in a virtual visit with your PCP:  Yes                    Current Advanced Directive/Advance Care Plan:  No code status on file, ACP docs on file, NOK is  Elke Ross                         Transition of Care Plan:                    I completed the assessment with the patient and her  in the room. I wore a mask. Patient lives with her  on the first floor of their home (she states there is an upstairs but she does not have to go up there). There are 2 steps to enter the home. Prior to admission she states she independent and drives. She has never had home health in the past. She states she does use walking sticks to get around. Patient's emergency contact is her husand Elke Gardinerbriannaera who can be reached at [phone]. He will be transporting her home at discharge as well. Patient usually gets her prescriptions at [facility] and they are normally covered by insurance. She sees Dr. Junior Carver as her PCP and last visit was in February of this year. Observation letter provided to patient, she declined to sign it. Noted discharge order, no discharge needs have been identified for this patient; she is OK for discharge from CM standpoint. Transition of Care Plan  1. Patient discharging today  2. Home with family assistance  3.  to transport  4. Patient will need to follow up with PCP, specialties as necessary   5. OK for discharge from CM standpoint. Care Management Interventions  PCP Verified by CM: Yes(Dr. Rashel Swenson)  Mode of Transport at Discharge:  Other (see comment)()  Transition of Care Consult (CM Consult): Discharge Planning  MyChart Signup: No  Discharge Durable Medical Equipment: No  Physical Therapy Consult: No  Occupational Therapy Consult: No  Speech Therapy Consult: No  Current Support Network: Lives with Spouse  Confirm Follow Up Transport: Family  Discharge Location  Discharge Placement: Home with family assistance    MEG Perry

## 2020-08-04 NOTE — PROGRESS NOTES
ORTHOPAEDIC CERVICAL FUSION PROGRESS NOTE    NAME:     Walker Urban   :       1960   MRN:       [id number]   DATE:      2020    POD:              1 Day Post-Op  S/P:              Procedure(s):  C5-C7 ANTERIOR CERVICAL DISCECTOMY FUSION WITH INSTRUMENTATION    SUBJECTIVE:  C/O sore throat   No arm pain or numbness  Denies nausea/vomiting, headache, chest pain or shortness of breath  Pain controlled    Recent Labs     20  0500   HGB 12.2   *   K 3.8      CO2 27   BUN 6   CREA 0.85   *     Patient Vitals for the past 12 hrs:   BP Temp Pulse Resp SpO2   20 0345 130/78 98.6 °F (37 °C) 99 16 98 %   20 0104 125/82 98.3 °F (36.8 °C) (!) 106 16 97 %   20 2032 139/87 98.2 °F (36.8 °C) (!) 111 16 93 %       Exam:  VISTA collar inplace / intact  Dressings clean and dry  Positive strength/ROM bilat upper ext.   Neuro intact to sensation  BL UEs NVID    PLAN:  Check hemovac drain output at 11 am  Continue PO pain medications as needed  Chloraseptic spray at bedside  Advance diet as tolerated  Out of bed w/ assist  Likely D/C to home today      Gracia Galvan [place]  Orthopaedic Surgery  Physician Assistant to Dr. Cachorro Odonnell

## 2020-08-17 ENCOUNTER — APPOINTMENT (OUTPATIENT)
Dept: GENERAL RADIOLOGY | Age: 60
End: 2020-08-17
Attending: STUDENT IN AN ORGANIZED HEALTH CARE EDUCATION/TRAINING PROGRAM
Payer: COMMERCIAL

## 2020-08-17 ENCOUNTER — HOSPITAL ENCOUNTER (OUTPATIENT)
Age: 60
Setting detail: OBSERVATION
Discharge: HOME | End: 2020-08-18
Attending: EMERGENCY MEDICINE | Admitting: INTERNAL MEDICINE
Payer: COMMERCIAL

## 2020-08-17 ENCOUNTER — APPOINTMENT (OUTPATIENT)
Dept: CT IMAGING | Age: 60
End: 2020-08-17
Attending: EMERGENCY MEDICINE
Payer: COMMERCIAL

## 2020-08-17 DIAGNOSIS — E87.1 HYPONATREMIA: ICD-10-CM

## 2020-08-17 DIAGNOSIS — M48.02 CERVICAL STENOSIS OF SPINAL CANAL: Primary | ICD-10-CM

## 2020-08-17 DIAGNOSIS — N30.00 ACUTE CYSTITIS WITHOUT HEMATURIA: ICD-10-CM

## 2020-08-17 DIAGNOSIS — R41.82 ALTERED MENTAL STATUS, UNSPECIFIED ALTERED MENTAL STATUS TYPE: ICD-10-CM
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
## 2020-08-17 PROBLEM — N39.0 UTI (URINARY TRACT INFECTION): Status: ACTIVE | Noted: 2020-08-17

## 2020-08-17 PROBLEM — R27.0 ATAXIA: Status: ACTIVE | Noted: 2020-08-17

## 2020-08-17 LAB
ALBUMIN SERPL-MCNC: 3.2 G/DL (ref 3.5–5)
ALBUMIN/GLOB SERPL: 0.7 {RATIO} (ref 1.1–2.2)
ALP SERPL-CCNC: 204 U/L (ref 45–117)
ALT SERPL-CCNC: 69 U/L (ref 12–78)
AMMONIA PLAS-SCNC: 20 UMOL/L
AMPHET UR QL SCN: NEGATIVE
ANION GAP SERPL CALC-SCNC: 8 MMOL/L (ref 5–15)
APPEARANCE UR: ABNORMAL
AST SERPL-CCNC: 56 U/L (ref 15–37)
BACTERIA URNS QL MICRO: ABNORMAL /HPF
BARBITURATES UR QL SCN: NEGATIVE
BASOPHILS # BLD: 0 K/UL (ref 0–0.1)
BASOPHILS NFR BLD: 0 % (ref 0–1)
BENZODIAZ UR QL: NEGATIVE
BILIRUB SERPL-MCNC: 0.6 MG/DL (ref 0.2–1)
BILIRUB UR QL: NEGATIVE
BUN SERPL-MCNC: 6 MG/DL (ref 6–20)
BUN/CREAT SERPL: 9 (ref 12–20)
CALCIUM SERPL-MCNC: 8.7 MG/DL (ref 8.5–10.1)
CANNABINOIDS UR QL SCN: NEGATIVE
CHLORIDE SERPL-SCNC: 92 MMOL/L (ref 97–108)
CO2 SERPL-SCNC: 28 MMOL/L (ref 21–32)
COCAINE UR QL SCN: NEGATIVE
COLOR UR: ABNORMAL
COMMENT, HOLDF: NORMAL
CREAT SERPL-MCNC: 0.67 MG/DL (ref 0.55–1.02)
DIFFERENTIAL METHOD BLD: ABNORMAL
DRUG SCRN COMMENT,DRGCM: ABNORMAL
EOSINOPHIL # BLD: 0.2 K/UL (ref 0–0.4)
EOSINOPHIL NFR BLD: 2 % (ref 0–7)
EPITH CASTS URNS QL MICRO: ABNORMAL /LPF
ERYTHROCYTE [DISTWIDTH] IN BLOOD BY AUTOMATED COUNT: 11.9 % (ref 11.5–14.5)
GLOBULIN SER CALC-MCNC: 4.8 G/DL (ref 2–4)
GLUCOSE SERPL-MCNC: 84 MG/DL (ref 65–100)
GLUCOSE UR STRIP.AUTO-MCNC: NEGATIVE MG/DL
HCT VFR BLD AUTO: 34.7 % (ref 35–47)
HGB BLD-MCNC: 12 G/DL (ref 11.5–16)
HGB UR QL STRIP: ABNORMAL
IMM GRANULOCYTES # BLD AUTO: 0.1 K/UL (ref 0–0.04)
IMM GRANULOCYTES NFR BLD AUTO: 1 % (ref 0–0.5)
KETONES UR QL STRIP.AUTO: ABNORMAL MG/DL
LACTATE BLD-SCNC: 0.48 MMOL/L (ref 0.4–2)
LEUKOCYTE ESTERASE UR QL STRIP.AUTO: ABNORMAL
LYMPHOCYTES # BLD: 1.1 K/UL (ref 0.8–3.5)
LYMPHOCYTES NFR BLD: 9 % (ref 12–49)
MAGNESIUM SERPL-MCNC: 2 MG/DL (ref 1.6–2.4)
MCH RBC QN AUTO: 35.4 PG (ref 26–34)
MCHC RBC AUTO-ENTMCNC: 34.6 G/DL (ref 30–36.5)
MCV RBC AUTO: 102.4 FL (ref 80–99)
METHADONE UR QL: NEGATIVE
MONOCYTES # BLD: 1 K/UL (ref 0–1)
MONOCYTES NFR BLD: 8 % (ref 5–13)
NEUTS SEG # BLD: 10 K/UL (ref 1.8–8)
NEUTS SEG NFR BLD: 80 % (ref 32–75)
NITRITE UR QL STRIP.AUTO: POSITIVE
NRBC # BLD: 0 K/UL (ref 0–0.01)
NRBC BLD-RTO: 0 PER 100 WBC
OPIATES UR QL: POSITIVE
PCP UR QL: NEGATIVE
PH UR STRIP: 7 [PH] (ref 5–8)
PHOSPHATE SERPL-MCNC: 3.5 MG/DL (ref 2.6–4.7)
PLATELET # BLD AUTO: 357 K/UL (ref 150–400)
PMV BLD AUTO: 8.7 FL (ref 8.9–12.9)
POTASSIUM SERPL-SCNC: 3.5 MMOL/L (ref 3.5–5.1)
PROT SERPL-MCNC: 8 G/DL (ref 6.4–8.2)
PROT UR STRIP-MCNC: ABNORMAL MG/DL
RBC # BLD AUTO: 3.39 M/UL (ref 3.8–5.2)
RBC #/AREA URNS HPF: ABNORMAL /HPF (ref 0–5)
SAMPLES BEING HELD,HOLD: NORMAL
SODIUM SERPL-SCNC: 128 MMOL/L (ref 136–145)
SP GR UR REFRACTOMETRY: 1.02 (ref 1–1.03)
TROPONIN I SERPL-MCNC: <0.05 NG/ML
TSH SERPL DL<=0.05 MIU/L-ACNC: 1.71 UIU/ML (ref 0.36–3.74)
UR CULT HOLD, URHOLD: NORMAL
UROBILINOGEN UR QL STRIP.AUTO: 1 EU/DL (ref 0.2–1)
WBC # BLD AUTO: 12.5 K/UL (ref 3.6–11)
WBC URNS QL MICRO: >100 /HPF (ref 0–4)

## 2020-08-17 PROCEDURE — 74011250637 HC RX REV CODE- 250/637: Performed by: INTERNAL MEDICINE

## 2020-08-17 PROCEDURE — 82140 ASSAY OF AMMONIA: CPT

## 2020-08-17 PROCEDURE — 84100 ASSAY OF PHOSPHORUS: CPT

## 2020-08-17 PROCEDURE — 87186 SC STD MICRODIL/AGAR DIL: CPT

## 2020-08-17 PROCEDURE — 99218 HC RM OBSERVATION: CPT

## 2020-08-17 PROCEDURE — 99284 EMERGENCY DEPT VISIT MOD MDM: CPT

## 2020-08-17 PROCEDURE — 71045 X-RAY EXAM CHEST 1 VIEW: CPT

## 2020-08-17 PROCEDURE — 72170 X-RAY EXAM OF PELVIS: CPT

## 2020-08-17 PROCEDURE — 83605 ASSAY OF LACTIC ACID: CPT

## 2020-08-17 PROCEDURE — 87040 BLOOD CULTURE FOR BACTERIA: CPT

## 2020-08-17 PROCEDURE — 65270000029 HC RM PRIVATE

## 2020-08-17 PROCEDURE — 36415 COLL VENOUS BLD VENIPUNCTURE: CPT

## 2020-08-17 PROCEDURE — 84484 ASSAY OF TROPONIN QUANT: CPT

## 2020-08-17 PROCEDURE — 87086 URINE CULTURE/COLONY COUNT: CPT

## 2020-08-17 PROCEDURE — 96374 THER/PROPH/DIAG INJ IV PUSH: CPT

## 2020-08-17 PROCEDURE — 80053 COMPREHEN METABOLIC PANEL: CPT

## 2020-08-17 PROCEDURE — 72125 CT NECK SPINE W/O DYE: CPT

## 2020-08-17 PROCEDURE — 85025 COMPLETE CBC W/AUTO DIFF WBC: CPT

## 2020-08-17 PROCEDURE — 87077 CULTURE AEROBIC IDENTIFY: CPT

## 2020-08-17 PROCEDURE — 74011250636 HC RX REV CODE- 250/636: Performed by: INTERNAL MEDICINE

## 2020-08-17 PROCEDURE — 80307 DRUG TEST PRSMV CHEM ANLYZR: CPT

## 2020-08-17 PROCEDURE — 83735 ASSAY OF MAGNESIUM: CPT

## 2020-08-17 PROCEDURE — 70450 CT HEAD/BRAIN W/O DYE: CPT

## 2020-08-17 PROCEDURE — 74011000250 HC RX REV CODE- 250: Performed by: STUDENT IN AN ORGANIZED HEALTH CARE EDUCATION/TRAINING PROGRAM

## 2020-08-17 PROCEDURE — 84443 ASSAY THYROID STIM HORMONE: CPT

## 2020-08-17 PROCEDURE — 74011250636 HC RX REV CODE- 250/636: Performed by: STUDENT IN AN ORGANIZED HEALTH CARE EDUCATION/TRAINING PROGRAM

## 2020-08-17 PROCEDURE — 93005 ELECTROCARDIOGRAM TRACING: CPT

## 2020-08-17 PROCEDURE — 81001 URINALYSIS AUTO W/SCOPE: CPT

## 2020-08-17 RX ORDER — POLYETHYLENE GLYCOL 3350 17 G/17G
17 POWDER, FOR SOLUTION ORAL DAILY PRN
Status: DISCONTINUED | OUTPATIENT
Start: 2020-08-17 | End: 2020-08-18 | Stop reason: HOSPADM

## 2020-08-17 RX ORDER — BUPROPION HYDROCHLORIDE 150 MG/1
300 TABLET ORAL
Status: DISCONTINUED | OUTPATIENT
Start: 2020-08-18 | End: 2020-08-18 | Stop reason: HOSPADM

## 2020-08-17 RX ORDER — PANTOPRAZOLE SODIUM 40 MG/1
40 TABLET, DELAYED RELEASE ORAL
Status: DISCONTINUED | OUTPATIENT
Start: 2020-08-18 | End: 2020-08-18 | Stop reason: HOSPADM

## 2020-08-17 RX ORDER — ATORVASTATIN CALCIUM 20 MG/1
20 TABLET, FILM COATED ORAL
Status: DISCONTINUED | OUTPATIENT
Start: 2020-08-17 | End: 2020-08-18 | Stop reason: HOSPADM

## 2020-08-17 RX ORDER — ACETAMINOPHEN 325 MG/1
650 TABLET ORAL
Status: DISCONTINUED | OUTPATIENT
Start: 2020-08-17 | End: 2020-08-18 | Stop reason: HOSPADM

## 2020-08-17 RX ORDER — ONDANSETRON 4 MG/1
4 TABLET, ORALLY DISINTEGRATING ORAL
Status: DISCONTINUED | OUTPATIENT
Start: 2020-08-17 | End: 2020-08-18 | Stop reason: HOSPADM

## 2020-08-17 RX ORDER — LAMOTRIGINE 100 MG/1
100 TABLET ORAL DAILY
Status: DISCONTINUED | OUTPATIENT
Start: 2020-08-18 | End: 2020-08-18 | Stop reason: HOSPADM

## 2020-08-17 RX ORDER — ONDANSETRON 2 MG/ML
4 INJECTION INTRAMUSCULAR; INTRAVENOUS
Status: DISCONTINUED | OUTPATIENT
Start: 2020-08-17 | End: 2020-08-18 | Stop reason: HOSPADM

## 2020-08-17 RX ORDER — CITALOPRAM 20 MG/1
40 TABLET, FILM COATED ORAL DAILY
Status: DISCONTINUED | OUTPATIENT
Start: 2020-08-18 | End: 2020-08-18 | Stop reason: HOSPADM

## 2020-08-17 RX ORDER — NEBIVOLOL 5 MG/1
5 TABLET ORAL
Status: DISCONTINUED | OUTPATIENT
Start: 2020-08-17 | End: 2020-08-18 | Stop reason: HOSPADM

## 2020-08-17 RX ORDER — ACETAMINOPHEN 650 MG/1
650 SUPPOSITORY RECTAL
Status: DISCONTINUED | OUTPATIENT
Start: 2020-08-17 | End: 2020-08-18 | Stop reason: HOSPADM

## 2020-08-17 RX ORDER — SODIUM CHLORIDE 9 MG/ML
75 INJECTION, SOLUTION INTRAVENOUS CONTINUOUS
Status: DISCONTINUED | OUTPATIENT
Start: 2020-08-17 | End: 2020-08-18 | Stop reason: HOSPADM

## 2020-08-17 RX ORDER — ENOXAPARIN SODIUM 100 MG/ML
40 INJECTION SUBCUTANEOUS DAILY
Status: DISCONTINUED | OUTPATIENT
Start: 2020-08-18 | End: 2020-08-18 | Stop reason: HOSPADM

## 2020-08-17 RX ORDER — OXYCODONE HYDROCHLORIDE 5 MG/1
5 TABLET ORAL
Status: ON HOLD | COMMUNITY
End: 2020-08-18 | Stop reason: SDUPTHER

## 2020-08-17 RX ADMIN — SODIUM CHLORIDE 75 ML/HR: 900 INJECTION, SOLUTION INTRAVENOUS at 22:37

## 2020-08-17 RX ADMIN — ATORVASTATIN CALCIUM 20 MG: 20 TABLET, FILM COATED ORAL at 22:36

## 2020-08-17 RX ADMIN — NEBIVOLOL HYDROCHLORIDE 5 MG: 5 TABLET ORAL at 22:36

## 2020-08-17 RX ADMIN — WATER 1 G: 1 INJECTION INTRAMUSCULAR; INTRAVENOUS; SUBCUTANEOUS at 20:16

## 2020-08-17 NOTE — ED PROVIDER NOTES
63 y/o F with PMH of HTN, anxiety, depression, Bipolar, and recent cervical spine surgery 2 weeks ago presents due to a fall and altered mental status. History provided by the patient and her . Pt had cervical spine surgery 8/3/20, reportedly was doing ok after surgery other than needing temporary increase in pain medication, then 5 days ago pt started becoming more confused, \"unsteady\" on her feet, and last night fell and hit her head on the wall,  says he witnessed the fall last night, pt slid to the floor after hitting her head but no LOC, and  thinks pt may have had unwitnessed fall this morning, but not sure. Per , pt has been having hallucinations, has been agitated, and is not herself. Pt is a  for a bank and only hx of similar problems was bipolar manic episode \"more than 10 years ago\" and  says pt has been stable on lamotrigine for many years without problems. Pt complains of pelvic pain. Patient denies any fever, chills, numbness, weakness, SOB, CP, cough, abdominal pain, dysuria, diarrhea, constipation, blood in urine or stool. Pt seen and examined with Dr. Ivy Segura. Chart reviewed.             Past Medical History:   Diagnosis Date    Anxiety and depression     Bipolar 2 disorder (HCC)     Chronic cough     Chronic low back pain     GERD (gastroesophageal reflux disease)     High cholesterol     HTN (hypertension)     PHYLICIA (obstructive sleep apnea)     Not using CPAP    Slow to wake up after anesthesia 1985       Past Surgical History:   Procedure Laterality Date    HX LAP CHOLECYSTECTOMY      HX LUMBAR LAMINECTOMY  1985    HX TUMOR REMOVAL      3 from uterus and one overy removed    IR INJ SINUS TRACT THERAP           Family History:   Problem Relation Age of Onset    Deep Vein Thrombosis Neg Hx     Anesth Problems Neg Hx        Social History     Socioeconomic History    Marital status:      Spouse name: Not on file    Number of children: Not on file    Years of education: Not on file    Highest education level: Not on file   Occupational History    Not on file   Social Needs    Financial resource strain: Not on file    Food insecurity     Worry: Not on file     Inability: Not on file    Transportation needs     Medical: Not on file     Non-medical: Not on file   Tobacco Use    Smoking status: Never Smoker    Smokeless tobacco: Never Used   Substance and Sexual Activity    Alcohol use: Yes     Alcohol/week: 14.0 standard drinks     Types: 14 Glasses of wine per week    Drug use: Never    Sexual activity: Not on file   Lifestyle    Physical activity     Days per week: Not on file     Minutes per session: Not on file    Stress: Not on file   Relationships    Social connections     Talks on phone: Not on file     Gets together: Not on file     Attends Ep[employer] service: Not on file     Active member of club or organization: Not on file     Attends meetings of clubs or organizations: Not on file     Relationship status: Not on file    Intimate partner violence     Fear of current or ex partner: Not on file     Emotionally abused: Not on file     Physically abused: Not on file     Forced sexual activity: Not on file   Other Topics Concern    Not on file   Social History Narrative    Not on file         ALLERGIES: Patient has no known allergies. Review of Systems   Constitutional: Negative for chills and fever. HENT: Negative for sore throat and trouble swallowing. Eyes: Negative for photophobia and pain. Respiratory: Negative for cough, choking and shortness of breath. Cardiovascular: Negative for chest pain and palpitations. Gastrointestinal: Negative for abdominal pain, constipation, diarrhea, nausea and vomiting. Genitourinary: Negative for dysuria and frequency. Musculoskeletal: Positive for back pain and neck pain. Negative for gait problem. Skin: Negative for rash.    Neurological: Negative for dizziness, syncope, weakness, light-headedness and numbness. Psychiatric/Behavioral: Positive for agitation and confusion. Vitals:    08/17/20 1511 08/17/20 1723   BP: 114/71 117/85   Pulse: (!) 103    Resp: 15    Temp: 98.5 °F (36.9 °C)    SpO2: 99% 97%   Weight: 89.4 kg (197 lb)    Height: 5' 7\" (1.702 m)             Physical Exam  Vitals signs reviewed. Constitutional:       Appearance: She is not toxic-appearing or diaphoretic. HENT:      Head: Normocephalic. Mouth/Throat:      Mouth: Mucous membranes are moist.      Pharynx: Oropharynx is clear. Eyes:      General: No scleral icterus. Extraocular Movements: Extraocular movements intact. Pupils: Pupils are equal, round, and reactive to light. Neck:      Comments: Cervical collar in place  Cardiovascular:      Rate and Rhythm: Regular rhythm. Tachycardia present. Heart sounds: No murmur. Pulmonary:      Effort: Pulmonary effort is normal. No respiratory distress. Breath sounds: No wheezing or rhonchi. Abdominal:      General: Bowel sounds are normal. There is no distension. Palpations: Abdomen is soft. Tenderness: There is no abdominal tenderness. Musculoskeletal: Normal range of motion. Comments: Strength 5/5 in all extremities, no calf tenderness, no LE swelling   Skin:     General: Skin is warm and dry. Neurological:      Mental Status: She is confused. Cranial Nerves: No cranial nerve deficit, dysarthria or facial asymmetry. Sensory: No sensory deficit. Motor: No weakness. Comments: Alert and oriented to person and time, not place   Psychiatric:         Behavior: Behavior is agitated.           MDM  Number of Diagnoses or Management Options  Acute cystitis without hematuria: new and requires workup  Altered mental status, unspecified altered mental status type: new and requires workup  Fall, initial encounter: new and requires workup  Hyponatremia: new and requires workup  Diagnosis management comments: 63 y/o F with worsening confusion over the past 5 days and fall last night with head and neck CT without acute abnormality, UA positive for UTI, sending cultures and starting Rocephin. EKG with NSR with rate of 98. Na 128, was 135 2 weeks ago. WBC 12.5. Due to AMS likely 2/2 to UTI, and hyponatremia have consulted hospitalist for admission. Amount and/or Complexity of Data Reviewed  Clinical lab tests: ordered and reviewed  Tests in the radiology section of CPT®: ordered and reviewed  Discuss the patient with other providers: yes    Risk of Complications, Morbidity, and/or Mortality  Presenting problems: moderate  Diagnostic procedures: moderate  Management options: moderate    Patient Progress  Patient progress: other (comment)         Procedures: none. Pt to be admitted for AMS, UTI, and hyponatremia.    Cheikh Palacios MD  08/17/20

## 2020-08-17 NOTE — ED TRIAGE NOTES
Pt here after falling yesterday when going to sit in a chair and striking head and tail bone. Reports that she is normally unsteady on feet but has become progressively worse over last few days with disorientation x 5 days. States that she is unable to walk around or even sit in chair without losing balance. Reports striking head multiple times. Denies LOC. Pt Cervical fusion 8/3. Pt spoke with Dr Perfecto Clark office who referred pt to ED.   Per , pt is having auditory and visual hallucinations

## 2020-08-17 NOTE — H&P
[facility]    Hospitalist Admission Note      NAME:  Todd Martinez   :   1960   MRN:  [id number]     PCP:  Roosevelt Woods MD     Date/Time of service:  2020 7:23 PM          Subjective:     CHIEF COMPLAINT: falls     HISTORY OF PRESENT ILLNESS:     Ms. Brigida Clifton is a 64 y.o.  female who presented to the Emergency Department complaining of falls.  provides reliable hx. Multiple falls over last week. Recent neck surgery 2 weeks ago. After surgery she started taking oxycodone as a new medicine and is taking 6 tabs a day, and flexeril is a new medicine and she is taking 4 tabs a day. She is clearly confused on exam.  ER finds UTI. We will admit her for management. Past Medical History:   Diagnosis Date    Anxiety and depression     Bipolar 2 disorder (HCC)     Cervical spine arthritis     Chronic cough     Chronic low back pain     GERD (gastroesophageal reflux disease)     High cholesterol     HTN (hypertension)     Obese     PHYLICIA (obstructive sleep apnea)     Not using CPAP        Past Surgical History:   Procedure Laterality Date    HX LAP CHOLECYSTECTOMY      HX LUMBAR LAMINECTOMY      HX TUMOR REMOVAL      3 from uterus and one overy removed    IR INJ SINUS TRACT THERAP         Social History     Tobacco Use    Smoking status: Never Smoker    Smokeless tobacco: Never Used   Substance Use Topics    Alcohol use: Yes     Alcohol/week: 14.0 standard drinks     Types: 14 Glasses of wine per week        Family History   Problem Relation Age of Onset    Deep Vein Thrombosis Neg Hx     Anesth Problems Neg Hx       Family hx cannot be fully assessed, since the patient cannot provide information    No Known Allergies     Prior to Admission medications    Medication Sig Start Date End Date Taking? Authorizing Provider   cyclobenzaprine (FLEXERIL) 10 mg tablet Take 1 Tab by mouth three (3) times daily as needed for Muscle Spasm(s).  20 Telly Cardenas NP   docusate sodium (COLACE) 100 mg capsule Take 1 Cap by mouth two (2) times a day. 8/4/20   Telly Cardenas NP   cholecalciferol (Vitamin D3) (5000 Units/125 mcg) tab tablet Take 1 Tab by mouth daily for 30 days. Indications: low vitamin D levels 7/28/20 8/27/20  Gay Matos NP   buPROPion XL (WELLBUTRIN XL) 300 mg XL tablet Take 300 mg by mouth every morning. Provider, Historical   citalopram (CELEXA) 40 mg tablet Take 40 mg by mouth every morning. Provider, Historical   lisinopril-hydroCHLOROthiazide (PRINZIDE, ZESTORETIC) 20-25 mg per tablet Take 1 Tab by mouth every morning. Provider, Historical   lamoTRIgine (LaMICtal) 100 mg tablet Take 100 mg by mouth every morning. Provider, Historical   simvastatin (ZOCOR) 40 mg tablet Take 40 mg by mouth nightly. Provider, Historical   nebivoloL (Bystolic) 5 mg tablet Take 5 mg by mouth nightly. Provider, Historical   multivitamin (ONE A DAY) tablet Take 1 Tab by mouth every morning. Provider, Historical   omeprazole (PRILOSEC) 20 mg capsule Take 20 mg by mouth daily (with dinner).     Provider, Historical       Review of Systems:  (bold if positive, if negative)    Gen:  Eyes:  ENT:  CVS:  Pulm:  GI:  :  MS:  Pain, weakness,arthritisSkin:  Psych:  Endo:  Hem:  Renal:  Neuro:  weakness      Objective:      VITALS:    Vital signs reviewed; most recent are:    Visit Vitals  /85   Pulse (!) 103   Temp 98.5 °F (36.9 °C)   Resp 15   Ht 5' 7\" (1.702 m)   Wt 89.4 kg (197 lb)   SpO2 97%   BMI 30.85 kg/m²     SpO2 Readings from Last 6 Encounters:   08/17/20 97%   08/04/20 95%   07/27/20 97%   04/07/17 97%        No intake or output data in the 24 hours ending 08/17/20 1923     Exam:     Physical Exam:    Gen:  Obese, in no acute distress  HEENT:  Pink conjunctivae, PERRL, hearing intact to voice, moist mucous membranes  Neck:  Supple, without masses, thyroid non-tender  Resp:  No accessory muscle use, clear breath sounds without wheezes rales or rhonchi  Card:  No murmurs, tachycardic S1, S2 without thrills, bruits or peripheral edema  Abd:  Soft, non-tender, non-distended, normoactive bowel sounds are present, no mass  Lymph:  No cervical or inguinal adenopathy  Musc:  No cyanosis or clubbing  Skin:  No rashes or ulcers, skin turgor is good  Neuro:  Cranial nerves are grossly intact, general motor weakness, follows commands   Psych: Moderate insight, oriented to person, place and time, alert     Labs:    Recent Labs     08/17/20  1543   WBC 12.5*   HGB 12.0   HCT 34.7*        Recent Labs     08/17/20  1543   *   K 3.5   CL 92*   CO2 28   GLU 84   BUN 6   CREA 0.67   CA 8.7   MG 2.0   PHOS 3.5   ALB 3.2*   TBILI 0.6   ALT 69     No results found for: GLUCPOC  No results for input(s): PH, PCO2, PO2, HCO3, FIO2 in the last 72 hours. No results for input(s): INR, INREXT in the last 72 hours. All Micro Results     Procedure Component Value Units Date/Time    CULTURE, URINE [[id number]]     Order Status:  Sent Specimen:  Cath Urine     CULTURE, BLOOD, PAIRED [[id number]]     Order Status:  Sent Specimen:  Blood     URINE CULTURE HOLD SAMPLE [[id number]] Collected:  08/17/20 1822    Order Status:  Completed Specimen:  Urine from Serum Updated:  08/17/20 1831     Urine culture hold       Urine on hold in Microbiology dept for 2 days. If unpreserved urine is submitted, it cannot be used for addtional testing after 24 hours, recollection will be required. I have reviewed previous records       Assessment and Plan:      UTI (urinary tract infection) - POA, based on UA. NOT septic. Follow cx. For now empiric ceftriaxone. Ataxia / Cervical stenosis of spinal canal / Chronic low back pain - Recent surgery. CT unremarkable. Consult ortho, and futher imaging and workup per them. Fall precautions. PT/OT eval. Hold flexeril and narcotics.     Acute metabolic encephalopathy / Bipolar 2 disorder / Anxiety and depression / Polypharmacy - Polypharmacy (narcotics and flexeril) and her underlying psychiatric conditions lkely contributes to falls. Continue bupropion, citalopram, lamotrigine    Hyponatremia - Likely mild dehydration vs the effects of SSRI. Hydrate with NS and follow. Unlikely to be symptomatic. Obese / PHYLICIA (obstructive sleep apnea) - Not using CPAP. Advise weight loss and compliance. Check ECHO for R heart diease    HTN (hypertension) - BP okay. Due to falls hold lisinopril and HCTZ and check orthostatics. Continue nebivolol    Chronic cough - Likely HCF vs GERD vs asthma vs other. Not hypoxic though xray abnormal.  Check ECHO. Check RVP and SARS screening. GERD (gastroesophageal reflux disease) - PPI    High cholesterol - continue simvastatin    Telemetry reviewed:   normal sinus rhythm    Risk of deterioration: high      Total time spent with patient: 79 Minutes I personally reviewed chart, notes, data and current medications in the medical record. I have personally examined and treated the patient at bedside during this period.                  Care Plan discussed with: Patient, Family, Care Manager, Nursing Staff and >50% of time spent in counseling and coordination of care    Discussed:  Care Plan       ___________________________________________________    Attending Physician: Haroon Rodriguez MD

## 2020-08-18 ENCOUNTER — APPOINTMENT (OUTPATIENT)
Dept: NON INVASIVE DIAGNOSTICS | Age: 60
End: 2020-08-18
Attending: INTERNAL MEDICINE
Payer: COMMERCIAL

## 2020-08-18 VITALS
WEIGHT: 197.09 LBS | HEIGHT: 67 IN | DIASTOLIC BLOOD PRESSURE: 84 MMHG | HEART RATE: 88 BPM | SYSTOLIC BLOOD PRESSURE: 124 MMHG | OXYGEN SATURATION: 98 % | BODY MASS INDEX: 30.93 KG/M2 | TEMPERATURE: 98.1 F | RESPIRATION RATE: 18 BRPM

## 2020-08-18 LAB
ALBUMIN SERPL-MCNC: 3.2 G/DL (ref 3.5–5)
ALBUMIN/GLOB SERPL: 0.7 {RATIO} (ref 1.1–2.2)
ALP SERPL-CCNC: 219 U/L (ref 45–117)
ALT SERPL-CCNC: 71 U/L (ref 12–78)
ANION GAP SERPL CALC-SCNC: 7 MMOL/L (ref 5–15)
AST SERPL-CCNC: 58 U/L (ref 15–37)
ATRIAL RATE: 98 BPM
BILIRUB SERPL-MCNC: 0.8 MG/DL (ref 0.2–1)
BUN SERPL-MCNC: 5 MG/DL (ref 6–20)
BUN/CREAT SERPL: 7 (ref 12–20)
CALCIUM SERPL-MCNC: 9 MG/DL (ref 8.5–10.1)
CALCULATED P AXIS, ECG09: 47 DEGREES
CALCULATED R AXIS, ECG10: -4 DEGREES
CALCULATED T AXIS, ECG11: 28 DEGREES
CHLORIDE SERPL-SCNC: 95 MMOL/L (ref 97–108)
CO2 SERPL-SCNC: 27 MMOL/L (ref 21–32)
CREAT SERPL-MCNC: 0.67 MG/DL (ref 0.55–1.02)
DIAGNOSIS, 93000: NORMAL
ECHO AO ASC DIAM: 3.7 CM
ECHO AO ROOT DIAM: 3.48 CM
ECHO AV AREA PEAK VELOCITY: 2.72 CM2
ECHO AV AREA/BSA PEAK VELOCITY: 1.4 CM2/M2
ECHO AV PEAK GRADIENT: 7.29 MMHG
ECHO AV PEAK VELOCITY: 135.04 CM/S
ECHO IVC PROX: 1.34 CM
ECHO LA AREA 4C: 12.58 CM2
ECHO LA VOL 2C: 52.89 ML (ref 22–52)
ECHO LA VOL 4C: 28.38 ML (ref 22–52)
ECHO LA VOL BP: 45.65 ML (ref 22–52)
ECHO LA VOL/BSA BIPLANE: 22.72 ML/M2 (ref 16–28)
ECHO LA VOLUME INDEX A2C: 26.32 ML/M2 (ref 16–28)
ECHO LA VOLUME INDEX A4C: 14.12 ML/M2 (ref 16–28)
ECHO LV E' LATERAL VELOCITY: 9 CENTIMETER/SECOND
ECHO LV E' SEPTAL VELOCITY: 7.77 CENTIMETER/SECOND
ECHO LV INTERNAL DIMENSION DIASTOLIC MMODE: 3.51 CM
ECHO LV INTERNAL DIMENSION SYSTOLIC MMODE: 1.64 CM
ECHO LV IVSD MMODE: 0.77 CM
ECHO LV IVSS MMODE: 1.38 CM
ECHO LV POSTERIOR WALL DIASTOLIC MMODE: 0.61 CM
ECHO LV POSTERIOR WALL SYSTOLIC MMODE: 1.22 CM
ECHO LVOT DIAM: 2.17 CM
ECHO LVOT PEAK GRADIENT: 3.97 MMHG
ECHO LVOT PEAK VELOCITY: 99.67 CM/S
ECHO LVOT SV: 71.3 ML
ECHO LVOT VTI: 19.33 CM
ECHO MV A VELOCITY: 75.02 CENTIMETER/SECOND
ECHO MV AREA PHT: 4.2 CM2
ECHO MV E DECELERATION TIME (DT): 0.18 S
ECHO MV E VELOCITY: 74.21 CENTIMETER/SECOND
ECHO MV PRESSURE HALF TIME (PHT): 0.05 S
ECHO RV INTERNAL DIMENSION: 2.71 CM
ECHO RV TAPSE: 1.51 CM (ref 1.5–2)
ECHO TV REGURGITANT MAX VELOCITY: 164.75 CM/S
ECHO TV REGURGITANT PEAK GRADIENT: 10.86 MMHG
ERYTHROCYTE [DISTWIDTH] IN BLOOD BY AUTOMATED COUNT: 11.8 % (ref 11.5–14.5)
FOLATE SERPL-MCNC: 26.2 NG/ML (ref 5–21)
GLOBULIN SER CALC-MCNC: 4.9 G/DL (ref 2–4)
GLUCOSE SERPL-MCNC: 92 MG/DL (ref 65–100)
HCT VFR BLD AUTO: 34.3 % (ref 35–47)
HGB BLD-MCNC: 11.8 G/DL (ref 11.5–16)
LA VOL DISK BP: 39.52 ML (ref 22–52)
LVOT MG: 2.06 MMHG
MAGNESIUM SERPL-MCNC: 2.1 MG/DL (ref 1.6–2.4)
MCH RBC QN AUTO: 35.4 PG (ref 26–34)
MCHC RBC AUTO-ENTMCNC: 34.4 G/DL (ref 30–36.5)
MCV RBC AUTO: 103 FL (ref 80–99)
NRBC # BLD: 0 K/UL (ref 0–0.01)
NRBC BLD-RTO: 0 PER 100 WBC
P-R INTERVAL, ECG05: 152 MS
PLATELET # BLD AUTO: 366 K/UL (ref 150–400)
PMV BLD AUTO: 8.9 FL (ref 8.9–12.9)
POTASSIUM SERPL-SCNC: 3.4 MMOL/L (ref 3.5–5.1)
PROT SERPL-MCNC: 8.1 G/DL (ref 6.4–8.2)
Q-T INTERVAL, ECG07: 374 MS
QRS DURATION, ECG06: 88 MS
QTC CALCULATION (BEZET), ECG08: 477 MS
RBC # BLD AUTO: 3.33 M/UL (ref 3.8–5.2)
SODIUM SERPL-SCNC: 129 MMOL/L (ref 136–145)
VENTRICULAR RATE, ECG03: 98 BPM
VIT B12 SERPL-MCNC: 1796 PG/ML (ref 193–986)
WBC # BLD AUTO: 12.2 K/UL (ref 3.6–11)

## 2020-08-18 PROCEDURE — 97530 THERAPEUTIC ACTIVITIES: CPT

## 2020-08-18 PROCEDURE — 80053 COMPREHEN METABOLIC PANEL: CPT

## 2020-08-18 PROCEDURE — 97535 SELF CARE MNGMENT TRAINING: CPT

## 2020-08-18 PROCEDURE — 96372 THER/PROPH/DIAG INJ SC/IM: CPT

## 2020-08-18 PROCEDURE — 97161 PT EVAL LOW COMPLEX 20 MIN: CPT

## 2020-08-18 PROCEDURE — 99218 HC RM OBSERVATION: CPT

## 2020-08-18 PROCEDURE — 82607 VITAMIN B-12: CPT

## 2020-08-18 PROCEDURE — 85027 COMPLETE CBC AUTOMATED: CPT

## 2020-08-18 PROCEDURE — 97165 OT EVAL LOW COMPLEX 30 MIN: CPT

## 2020-08-18 PROCEDURE — 93306 TTE W/DOPPLER COMPLETE: CPT

## 2020-08-18 PROCEDURE — 36415 COLL VENOUS BLD VENIPUNCTURE: CPT

## 2020-08-18 PROCEDURE — 74011250637 HC RX REV CODE- 250/637: Performed by: INTERNAL MEDICINE

## 2020-08-18 PROCEDURE — 83735 ASSAY OF MAGNESIUM: CPT

## 2020-08-18 PROCEDURE — 74011250636 HC RX REV CODE- 250/636: Performed by: INTERNAL MEDICINE

## 2020-08-18 PROCEDURE — 82746 ASSAY OF FOLIC ACID SERUM: CPT

## 2020-08-18 RX ORDER — CYCLOBENZAPRINE HCL 10 MG
10 TABLET ORAL
Qty: 15 TAB | Refills: 0 | Status: SHIPPED | OUTPATIENT
Start: 2020-08-18 | End: 2020-09-17

## 2020-08-18 RX ORDER — DEXTROMETHORPHAN POLISTIREX 30 MG/5 ML
SUSPENSION, EXTENDED RELEASE 12 HR ORAL AS NEEDED
Status: DISCONTINUED | OUTPATIENT
Start: 2020-08-18 | End: 2020-08-18 | Stop reason: HOSPADM

## 2020-08-18 RX ORDER — LISINOPRIL 20 MG/1
20 TABLET ORAL DAILY
Status: DISCONTINUED | OUTPATIENT
Start: 2020-08-18 | End: 2020-08-18 | Stop reason: HOSPADM

## 2020-08-18 RX ORDER — CEFDINIR 300 MG/1
300 CAPSULE ORAL 2 TIMES DAILY
Qty: 10 CAP | Refills: 0 | Status: SHIPPED | OUTPATIENT
Start: 2020-08-18 | End: 2020-08-23

## 2020-08-18 RX ORDER — ACETAMINOPHEN 325 MG/1
650 TABLET ORAL EVERY 6 HOURS
Qty: 30 TAB | Refills: 0 | Status: SHIPPED
Start: 2020-08-18

## 2020-08-18 RX ORDER — OXYCODONE HYDROCHLORIDE 5 MG/1
5 TABLET ORAL
Qty: 10 TAB | Refills: 0 | Status: SHIPPED | OUTPATIENT
Start: 2020-08-18 | End: 2020-08-23

## 2020-08-18 RX ORDER — LISINOPRIL 20 MG/1
20 TABLET ORAL DAILY
Qty: 30 TAB | Refills: 0 | Status: SHIPPED | OUTPATIENT
Start: 2020-08-19

## 2020-08-18 RX ADMIN — PANTOPRAZOLE SODIUM 40 MG: 40 TABLET, DELAYED RELEASE ORAL at 07:12

## 2020-08-18 RX ADMIN — LAMOTRIGINE 100 MG: 100 TABLET ORAL at 10:12

## 2020-08-18 RX ADMIN — ENOXAPARIN SODIUM 40 MG: 40 INJECTION SUBCUTANEOUS at 10:12

## 2020-08-18 RX ADMIN — LISINOPRIL 20 MG: 20 TABLET ORAL at 10:12

## 2020-08-18 RX ADMIN — CITALOPRAM HYDROBROMIDE 40 MG: 20 TABLET ORAL at 10:12

## 2020-08-18 RX ADMIN — POLYETHYLENE GLYCOL 3350 17 G: 17 POWDER, FOR SOLUTION ORAL at 07:10

## 2020-08-18 RX ADMIN — BUPROPION HYDROCHLORIDE 300 MG: 150 TABLET, EXTENDED RELEASE ORAL at 07:11

## 2020-08-18 NOTE — DISCHARGE INSTRUCTIONS
HOSPITALIST DISCHARGE INSTRUCTIONS  NAME: Cinda Briggs   :  1960   MRN:  [id number]     Date/Time:  2020 1:19 PM    ADMIT DATE: 2020     DISCHARGE DATE: 2020     ADMITTING DIAGNOSIS:  Urinary tract infection   Low sodium levels   Overmedication     DISCHARGE DIAGNOSIS:  As above     MEDICATIONS:  PLEASE NOTE YOUR COMBINATION HCTZ-LISINOPRIL WAS CHANGED TO JUST LISINOPRIL DUE TO LOW SODIUM LEVELS AND LOWER BLOOD PRESSURE LEVELS      · It is important that you take the medication exactly as they are prescribed. · Keep your medication in the bottles provided by the pharmacist and keep a list of the medication names, dosages, and times to be taken in your wallet. · Do not take other medications without consulting your doctor. Pain Management: per above medications    What to do at Home    Recommended diet:  Resume previous diet    Recommended activity: Activity as tolerated    If you experience any of the following symptoms then please call your primary care physician or return to the emergency room if you cannot get hold of your doctor:  Fever, chills, nausea, vomiting, diarrhea, change in mentation, falling, bleeding, shortness of breath, chest pain     Follow Up: Follow-up Information     Follow up With Specialties Details Why Contact Info    [address] Naeem Quintanilla MD Family Medicine   [address] [place] [address]  [phone]          Information obtained by :  I understand that if any problems occur once I am at home I am to contact my physician. I understand and acknowledge receipt of the instructions indicated above.                                                                                                                                            Physician's or R.N.'s Signature                                                                  Date/Time Patient or Representative Signature                                                          Date/Time

## 2020-08-18 NOTE — PROGRESS NOTES
[facility]: MED RECONCILIATION    Comments/Recommendations:   Reviewed PTA medications with patient and spouse at bedside, patient's spouse provides an updated medication history. Updated preferred pharmacy    Medications added:     Oxycodone 5 mg every 4-6 hrs PRN    Medications removed:    Omeprazole  Docusate    Medications adjusted:    Nebivolol- confirmed pt takes @ bedtime    Information obtained from: Patient's spouse, [facility], [facility]    Allergies: Patient has no known allergies. Prior to Admission Medications:     Medication Documentation Review Audit       Reviewed by CIRILO TorresD (Pharmacist) on 08/17/20 at 2055      Medication Sig Documenting Provider Last Dose Status Taking? buPROPion XL (WELLBUTRIN XL) 300 mg XL tablet Take 300 mg by mouth every morning. Provider, Historical 8/17/2020 Active Yes   cholecalciferol (Vitamin D3) (5000 Units/125 mcg) tab tablet Take 1 Tab by mouth daily for 30 days. Indications: low vitamin D levels Mahad Turcios NP 8/17/2020 Active Yes   citalopram (CELEXA) 40 mg tablet Take 40 mg by mouth every morning. Provider, Historical 8/17/2020 Active Yes   cyclobenzaprine (FLEXERIL) 10 mg tablet Take 1 Tab by mouth three (3) times daily as needed for Muscle Spasm(s). Willy Phalen, NP 8/17/2020 Active Yes   lamoTRIgine (LaMICtal) 100 mg tablet Take 100 mg by mouth every morning. Provider, Historical 8/17/2020 Active Yes   lisinopril-hydroCHLOROthiazide (PRINZIDE, ZESTORETIC) 20-25 mg per tablet Take 1 Tab by mouth every morning. Provider, Historical 8/17/2020 Active Yes   multivitamin (ONE A DAY) tablet Take 1 Tab by mouth every morning. Provider, Historical 8/17/2020 Active Yes   nebivoloL (Bystolic) 5 mg tablet Take 5 mg by mouth nightly. Provider, Historical 8/16/2020 Active Yes   oxyCODONE IR (ROXICODONE) 5 mg immediate release tablet Take 5 mg by mouth every four (4) hours as needed for Pain.  Provider, Historical 8/17/2020 Active Yes   simvastatin (ZOCOR) 40 mg tablet Take 40 mg by mouth nightly.  Provider, Historical 8/16/2020 Active Yes                  Thanks,  Conor Seay, PHARMD   Contact: [phone]

## 2020-08-18 NOTE — CONSULTS
Informed by Dr. Khushi Cervantes that pts condition had improved and no longer required and neurology consult. We remain available for reconsult as needed.

## 2020-08-18 NOTE — ROUTINE PROCESS
TRANSFER - OUT REPORT: 
 
Verbal report given to Rhianna Jules RN(name) on Todd Martinez  being transferred to Medical Room 530(unit) for routine progression of care Report consisted of patients Situation, Background, Assessment and  
Recommendations(SBAR). Information from the following report(s) SBAR, ED Summary, [facility] and Recent Results was reviewed with the receiving nurse. Lines:  
Peripheral IV 08/17/20 Right Antecubital (Active) Site Assessment Clean, dry, & intact 08/17/20 1542 Phlebitis Assessment 0 08/17/20 1542 Infiltration Assessment 0 08/17/20 1542 Dressing Status Clean, dry, & intact 08/17/20 1542 Dressing Type Tape;Transparent 08/17/20 1542 Hub Color/Line Status Pink;Flushed 08/17/20 1542 Action Taken Blood drawn 08/17/20 1542 Alcohol Cap Used Yes 08/17/20 1542 Opportunity for questions and clarification was provided. Patient transported with: 
 Registered Nurse

## 2020-08-18 NOTE — CONSULTS
ORTHOPAEDIC CONSULT NOTE    Subjective:     Date of Consultation:  August 18, 2020      Srinivasa Daley is a 64 y.o. female who was admitted due to difficulty walking, confusion and fall.  at bedside, states she was going great after her neck surgery(c-spine fusion 8/3/2020) and then started struggling a couple days ago. States she fell and hit her head on the wall 8/16   Ambulates at baseline unassisted. Upon entering the room pt sleeping soundly, awakened to touch from . States she has NO neck complaints and is feeling better overall since being admitted. Working well with PT/OT    Patient Active Problem List    Diagnosis Date Noted    UTI (urinary tract infection) 08/17/2020    Hyponatremia 08/17/2020    Ataxia 08/17/2020    Anxiety and depression     Bipolar 2 disorder (HCC)     Cervical spine arthritis     Chronic cough     Chronic low back pain     GERD (gastroesophageal reflux disease)     High cholesterol     HTN (hypertension)     Obese     PHYLICIA (obstructive sleep apnea)     Cervical stenosis of spinal canal 08/03/2020     Family History   Problem Relation Age of Onset    Deep Vein Thrombosis Neg Hx     Anesth Problems Neg Hx       Social History     Tobacco Use    Smoking status: Never Smoker    Smokeless tobacco: Never Used   Substance Use Topics    Alcohol use:  Yes     Alcohol/week: 14.0 standard drinks     Types: 14 Glasses of wine per week     Past Medical History:   Diagnosis Date    Anxiety and depression     Bipolar 2 disorder (HCC)     Cervical spine arthritis     Chronic cough     Chronic low back pain     GERD (gastroesophageal reflux disease)     High cholesterol     HTN (hypertension)     Obese     PHYLICIA (obstructive sleep apnea)     Not using CPAP      Past Surgical History:   Procedure Laterality Date    HX LAP CHOLECYSTECTOMY      HX LUMBAR LAMINECTOMY  1985    HX TUMOR REMOVAL      3 from uterus and one overy removed    IR INJ SINUS TRACT THERAP        Prior to Admission medications    Medication Sig Start Date End Date Taking? Authorizing Provider   cyclobenzaprine (FLEXERIL) 10 mg tablet Take 1 Tab by mouth three (3) times daily as needed for Muscle Spasm(s) for up to 30 days. 8/18/20 9/17/20 Yes lBadimir Chirinos MD   oxyCODONE IR (ROXICODONE) 5 mg immediate release tablet Take 1 Tab by mouth every four (4) hours as needed for Pain for up to 5 days. Max Daily Amount: 30 mg. 8/18/20 8/23/20 Yes Bladimir Chirinos MD   cefdinir (OMNICEF) 300 mg capsule Take 1 Cap by mouth two (2) times a day for 5 days. 8/18/20 8/23/20 Yes Bladimir Chirinos MD   lisinopriL (PRINIVIL, ZESTRIL) 20 mg tablet Take 1 Tab by mouth daily. 8/19/20  Yes Bladimir Chirinos MD   acetaminophen (TylenoL) 325 mg tablet Take 2 Tabs by mouth every six (6) hours. 8/18/20  Yes Bladimir Chirinos MD   cholecalciferol (Vitamin D3) (5000 Units/125 mcg) tab tablet Take 1 Tab by mouth daily for 30 days. Indications: low vitamin D levels 7/28/20 8/27/20 Yes Santosh Delgado NP   buPROPion XL (WELLBUTRIN XL) 300 mg XL tablet Take 300 mg by mouth every morning. Yes Provider, Historical   citalopram (CELEXA) 40 mg tablet Take 40 mg by mouth every morning. Yes Provider, Historical   lisinopril-hydroCHLOROthiazide (PRINZIDE, ZESTORETIC) 20-25 mg per tablet Take 1 Tab by mouth every morning. Yes Provider, Historical   lamoTRIgine (LaMICtal) 100 mg tablet Take 100 mg by mouth every morning. Yes Provider, Historical   simvastatin (ZOCOR) 40 mg tablet Take 40 mg by mouth nightly. Yes Provider, Historical   nebivoloL (Bystolic) 5 mg tablet Take 5 mg by mouth nightly. Yes Provider, Historical   multivitamin (ONE A DAY) tablet Take 1 Tab by mouth every morning.    Yes Provider, Historical     Current Facility-Administered Medications   Medication Dose Route Frequency    lisinopriL (PRINIVIL, ZESTRIL) tablet 20 mg  20 mg Oral DAILY    mineral oil (FLEET) enema Rectal PRN    cefTRIAXone (ROCEPHIN) 1 g in 0.9% sodium chloride (MBP/ADV) 50 mL  1 g IntraVENous Q24H    buPROPion XL (WELLBUTRIN XL) tablet 300 mg  300 mg Oral 7am    citalopram (CELEXA) tablet 40 mg  40 mg Oral DAILY    lamoTRIgine (LaMICtal) tablet 100 mg  100 mg Oral DAILY    nebivoloL (BYSTOLIC) tablet 5 mg  5 mg Oral QHS    pantoprazole (PROTONIX) tablet 40 mg  40 mg Oral ACB    atorvastatin (LIPITOR) tablet 20 mg  20 mg Oral QHS    acetaminophen (TYLENOL) tablet 650 mg  650 mg Oral Q6H PRN    Or    acetaminophen (TYLENOL) suppository 650 mg  650 mg Rectal Q6H PRN    polyethylene glycol (MIRALAX) packet 17 g  17 g Oral DAILY PRN    ondansetron (ZOFRAN ODT) tablet 4 mg  4 mg Oral Q8H PRN    Or    ondansetron (ZOFRAN) injection 4 mg  4 mg IntraVENous Q6H PRN    enoxaparin (LOVENOX) injection 40 mg  40 mg SubCUTAneous DAILY    0.9% sodium chloride infusion  75 mL/hr IntraVENous CONTINUOUS      No Known Allergies     Review of Systems:  A comprehensive review of systems was negative except for that written in the HPI. Mental Status: no dementia    Objective:     Patient Vitals for the past 8 hrs:   BP Temp Pulse Resp SpO2   20 1233 128/84 98.1 °F (36.7 °C) 88 18 98 %   20 0748 150/78 97.5 °F (36.4 °C) 88 18 98 %     Temp (24hrs), Av.8 °F (36.6 °C), Min:97.3 °F (36.3 °C), Max:98.5 °F (36.9 °C)      Gen: Well-developed,  in no acute distress   HEENT: Pink conjunctivae, hearing intact to voice, moist mucous membranes   Neck: Supple  Resp: No respiratory distress   Card: RRR, palpable distal pulse-equal bilaterally, birsk cap refill all distal digits   Musc: left anterior neck with well healed incision, collar in place. UE with equal AROM, 5/5 MMT equal bilat. LE with pain free AROM and excellent equal strength. Skin: No skin breakdown noted.  Skin warm, pink, dry  Neuro: Cranial nerves are grossly intact, no focal motor weakness, follows commands appropriately   Psych: Good insight, oriented to person, place and time, alert    Imaging Review:  8/17/2020    EXAM:  CT CERVICAL SPINE WITHOUT CONTRAST   INDICATION: fall, pain, recent neck surgery.  COMPARISON: None. TECHNIQUE: Multislice helical CT of the cervical spine was performed without  intravenous contrast administration. Sagittal and coronal reformats were  generated. CT dose reduction was achieved through use of a standardized  protocol tailored for this examination and automatic exposure control for dose modulation.  FINDINGS:  Reversal of normal lordosis, prior anterior cervical fusion with good alignment at 5 6 and C6-7. Prominent facet hypertrophy on the left at C3-C4. No acute fracture or dislocation. IMPRESSION: No acute changes. EXAM: CT HEAD WO CONT   INDICATION: falls, head trauma   COMPARISON: None.   CONTRAST: None.   TECHNIQUE: Unenhanced CT of the head was performed using 5 mm images. Brain and  bone windows were generated. Coronal and sagittal reformats. CT dose reduction  was achieved through use of a standardized protocol tailored for this  examination and automatic exposure control for dose modulation.  FINDINGS:No extra-axial fluid collection, hemorrhage shift or masses her. IMPRESSION: No acute changes.       Labs:   Recent Results (from the past 24 hour(s))   SAMPLES BEING HELD    Collection Time: 08/17/20  3:43 PM   Result Value Ref Range    SAMPLES BEING HELD [email or web address]     COMMENT        Add-on orders for these samples will be processed based on acceptable specimen integrity and analyte stability, which may vary by analyte.    CBC WITH AUTOMATED DIFF    Collection Time: 08/17/20  3:43 PM   Result Value Ref Range    WBC 12.5 (H) 3.6 - 11.0 K/uL    RBC 3.39 (L) 3.80 - 5.20 M/uL    HGB 12.0 11.5 - 16.0 g/dL    HCT 34.7 (L) 35.0 - 47.0 %    .4 (H) 80.0 - 99.0 FL    MCH 35.4 (H) 26.0 - 34.0 PG    MCHC 34.6 30.0 - 36.5 g/dL    RDW 11.9 11.5 - 14.5 %    PLATELET 747 894 - 049 K/uL    MPV 8.7 (L) 8.9 - 12.9 FL    NRBC 0.0 0  WBC    ABSOLUTE NRBC 0.00 0.00 - 0.01 K/uL    NEUTROPHILS 80 (H) 32 - 75 %    LYMPHOCYTES 9 (L) 12 - 49 %    MONOCYTES 8 5 - 13 %    EOSINOPHILS 2 0 - 7 %    BASOPHILS 0 0 - 1 %    IMMATURE GRANULOCYTES 1 (H) 0.0 - 0.5 %    ABS. NEUTROPHILS 10.0 (H) 1.8 - 8.0 K/UL    ABS. LYMPHOCYTES 1.1 0.8 - 3.5 K/UL    ABS. MONOCYTES 1.0 0.0 - 1.0 K/UL    ABS. EOSINOPHILS 0.2 0.0 - 0.4 K/UL    ABS. BASOPHILS 0.0 0.0 - 0.1 K/UL    ABS. IMM. GRANS. 0.1 (H) 0.00 - 0.04 K/UL    DF AUTOMATED     METABOLIC PANEL, COMPREHENSIVE    Collection Time: 08/17/20  3:43 PM   Result Value Ref Range    Sodium 128 (L) 136 - 145 mmol/L    Potassium 3.5 3.5 - 5.1 mmol/L    Chloride 92 (L) 97 - 108 mmol/L    CO2 28 21 - 32 mmol/L    Anion gap 8 5 - 15 mmol/L    Glucose 84 65 - 100 mg/dL    BUN 6 6 - 20 MG/DL    Creatinine 0.67 0.55 - 1.02 MG/DL    BUN/Creatinine ratio 9 (L) 12 - 20      GFR est AA >60 >60 ml/min/1.73m2    GFR est non-AA >60 >60 ml/min/1.73m2    Calcium 8.7 8.5 - 10.1 MG/DL    Bilirubin, total 0.6 0.2 - 1.0 MG/DL    ALT (SGPT) 69 12 - 78 U/L    AST (SGOT) 56 (H) 15 - 37 U/L    Alk.  phosphatase 204 (H) 45 - 117 U/L    Protein, total 8.0 6.4 - 8.2 g/dL    Albumin 3.2 (L) 3.5 - 5.0 g/dL    Globulin 4.8 (H) 2.0 - 4.0 g/dL    A-G Ratio 0.7 (L) 1.1 - 2.2     TROPONIN I    Collection Time: 08/17/20  3:43 PM   Result Value Ref Range    Troponin-I, Qt. <0.05 <0.05 ng/mL   MAGNESIUM    Collection Time: 08/17/20  3:43 PM   Result Value Ref Range    Magnesium 2.0 1.6 - 2.4 mg/dL   PHOSPHORUS    Collection Time: 08/17/20  3:43 PM   Result Value Ref Range    Phosphorus 3.5 2.6 - 4.7 MG/DL   TSH 3RD GENERATION    Collection Time: 08/17/20  3:43 PM   Result Value Ref Range    TSH 1.71 0.36 - 3.74 uIU/mL   EKG, 12 LEAD, INITIAL    Collection Time: 08/17/20  3:48 PM   Result Value Ref Range    Ventricular Rate 98 BPM    Atrial Rate 98 BPM    P-R Interval 152 ms    QRS Duration 88 ms    Q-T Interval 374 ms    QTC Calculation (Bezet) 477 ms    Calculated P Axis 47 degrees    Calculated R Axis -4 degrees    Calculated T Axis 28 degrees    Diagnosis       Normal sinus rhythm  Normal ECG  When compared with ECG of 27-JUL-2020 15:13,  No significant change was found     URINALYSIS W/MICROSCOPIC    Collection Time: 08/17/20  6:22 PM   Result Value Ref Range    Color DARK YELLOW      Appearance TURBID (A) CLEAR      Specific gravity 1.020 1.003 - 1.030      pH (UA) 7.0 5.0 - 8.0      Protein TRACE (A) NEG mg/dL    Glucose Negative NEG mg/dL    Ketone TRACE (A) NEG mg/dL    Bilirubin Negative NEG      Blood MODERATE (A) NEG      Urobilinogen 1.0 0.2 - 1.0 EU/dL    Nitrites Positive (A) NEG      Leukocyte Esterase LARGE (A) NEG      WBC >100 (H) 0 - 4 /hpf    RBC 5-10 0 - 5 /hpf    Epithelial cells MODERATE (A) FEW /lpf    Bacteria 3+ (A) NEG /hpf   URINE CULTURE HOLD SAMPLE    Collection Time: 08/17/20  6:22 PM    Specimen: Serum; Urine   Result Value Ref Range    Urine culture hold        Urine on hold in Microbiology dept for 2 days. If unpreserved urine is submitted, it cannot be used for addtional testing after 24 hours, recollection will be required.    DRUG SCREEN, URINE    Collection Time: 08/17/20  6:22 PM   Result Value Ref Range    AMPHETAMINES Negative NEG      BARBITURATES Negative NEG      BENZODIAZEPINES Negative NEG      COCAINE Negative NEG      METHADONE Negative NEG      OPIATES Positive (A) NEG      PCP(PHENCYCLIDINE) Negative NEG      THC (TH-CANNABINOL) Negative NEG      Drug screen comment (NOTE)    AMMONIA    Collection Time: 08/17/20  6:43 PM   Result Value Ref Range    Ammonia 20 <32 UMOL/L   CULTURE, BLOOD, PAIRED    Collection Time: 08/17/20  8:10 PM    Specimen: Blood   Result Value Ref Range    Special Requests: NO SPECIAL REQUESTS      Culture result: NO GROWTH AFTER 12 HOURS     POC LACTIC ACID    Collection Time: 08/17/20  8:16 PM   Result Value Ref Range    Lactic Acid (POC) 0.48 0.40 - 2.00 mmol/L METABOLIC PANEL, COMPREHENSIVE    Collection Time: 08/18/20  3:46 AM   Result Value Ref Range    Sodium 129 (L) 136 - 145 mmol/L    Potassium 3.4 (L) 3.5 - 5.1 mmol/L    Chloride 95 (L) 97 - 108 mmol/L    CO2 27 21 - 32 mmol/L    Anion gap 7 5 - 15 mmol/L    Glucose 92 65 - 100 mg/dL    BUN 5 (L) 6 - 20 MG/DL    Creatinine 0.67 0.55 - 1.02 MG/DL    BUN/Creatinine ratio 7 (L) 12 - 20      GFR est AA >60 >60 ml/min/1.73m2    GFR est non-AA >60 >60 ml/min/1.73m2    Calcium 9.0 8.5 - 10.1 MG/DL    Bilirubin, total 0.8 0.2 - 1.0 MG/DL    ALT (SGPT) 71 12 - 78 U/L    AST (SGOT) 58 (H) 15 - 37 U/L    Alk.  phosphatase 219 (H) 45 - 117 U/L    Protein, total 8.1 6.4 - 8.2 g/dL    Albumin 3.2 (L) 3.5 - 5.0 g/dL    Globulin 4.9 (H) 2.0 - 4.0 g/dL    A-G Ratio 0.7 (L) 1.1 - 2.2     MAGNESIUM    Collection Time: 08/18/20  3:46 AM   Result Value Ref Range    Magnesium 2.1 1.6 - 2.4 mg/dL   CBC W/O DIFF    Collection Time: 08/18/20  3:46 AM   Result Value Ref Range    WBC 12.2 (H) 3.6 - 11.0 K/uL    RBC 3.33 (L) 3.80 - 5.20 M/uL    HGB 11.8 11.5 - 16.0 g/dL    HCT 34.3 (L) 35.0 - 47.0 %    .0 (H) 80.0 - 99.0 FL    MCH 35.4 (H) 26.0 - 34.0 PG    MCHC 34.4 30.0 - 36.5 g/dL    RDW 11.8 11.5 - 14.5 %    PLATELET 373 664 - 689 K/uL    MPV 8.9 8.9 - 12.9 FL    NRBC 0.0 0  WBC    ABSOLUTE NRBC 0.00 0.00 - 0.01 K/uL         Impression:     Patient Active Problem List    Diagnosis Date Noted    UTI (urinary tract infection) 08/17/2020    Hyponatremia 08/17/2020    Ataxia 08/17/2020    Anxiety and depression     Bipolar 2 disorder (HCC)     Cervical spine arthritis     Chronic cough     Chronic low back pain     GERD (gastroesophageal reflux disease)     High cholesterol     HTN (hypertension)     Obese     PHYLICIA (obstructive sleep apnea)     Cervical stenosis of spinal canal 08/03/2020     Active Problems:    Cervical stenosis of spinal canal (8/3/2020)      UTI (urinary tract infection) (8/17/2020)      Anxiety and depression ()      Bipolar 2 disorder (HCC) ()      Cervical spine arthritis ()      Chronic cough ()      Chronic low back pain ()      GERD (gastroesophageal reflux disease) ()      High cholesterol ()      HTN (hypertension) ()      Obese ()      PHYLICIA (obstructive sleep apnea) ()      Overview: Not using CPAP      Hyponatremia (8/17/2020)      Ataxia (8/17/2020)        Plan:     No indication of post-op complication  Continue c-colar  Activities with PT/OT ok  Admission complaints/symptoms-- likely due to med side effects and UTI  Dr Ko Power team aware of admission-- will follow Wed if still admitted    Dr. Curly Escobar aware and agrees with plan as above.       Gianna Fuentes PA-C  [employer]

## 2020-08-18 NOTE — PROGRESS NOTES
OCCUPATIONAL THERAPY EVALUATION/DISCHARGE  Patient: Carol Moreau (65 y.o. female)  Date: 8/18/2020  Primary Diagnosis: UTI (urinary tract infection) [N39.0]       Precautions: Other (comment)(cervical)    ASSESSMENT  Based on the objective data described below, the patient presents at [facility] with self-care and mobility. Feel pt is approaching baseline. Per , she is doing 100x better compared to 24 hours ago. Initial symptoms may have resulted from meds and UTI (pt was taking flexoril and oxy). Secured cervical collar d/t increase neck movement. Verbalized understanding. Feel pt is safe for discharge once medically cleared. No further recommendations at this time. Will complete order. Current Level of Function (ADLs/self-care): CGA    Functional Outcome Measure: The patient scored 75/100 on the Barthel outcome measure which is indicative of impairment. Other factors to consider for discharge:      PLAN :  Recommend with staff: assistance with ambulating to bathroom    Recommendation for discharge: (in order for the patient to meet his/her long term goals)  No skilled occupational therapy/ follow up rehabilitation needs identified at this time. This discharge recommendation:  A follow-up discussion with the attending provider and/or case management is planned    IF patient discharges home will need the following DME: none       SUBJECTIVE:   Patient stated I get dizzy when I bend over.     OBJECTIVE DATA SUMMARY:   HISTORY:   Past Medical History:   Diagnosis Date    Anxiety and depression     Bipolar 2 disorder (HCC)     Cervical spine arthritis     Chronic cough     Chronic low back pain     GERD (gastroesophageal reflux disease)     High cholesterol     HTN (hypertension)     Obese     PHYLICIA (obstructive sleep apnea)     Not using CPAP     Past Surgical History:   Procedure Laterality Date    HX LAP CHOLECYSTECTOMY      HX LUMBAR LAMINECTOMY  1985    HX TUMOR REMOVAL 3 from uterus and one overy removed    IR INJ SINUS TRACT THERAP         Prior Level of Function/Environment/Context:   Expanded or extensive additional review of patient history:   Home Situation  Home Environment: Private residence  # Steps to Enter: 3  Rails to Enter: No  One/Two Story Residence: Two story, live on 1st floor  Living Alone: No  Support Systems: Spouse/Significant Other/Partner  Patient Expects to be Discharged to[de-identified] Private residence  Current DME Used/Available at Home: Other (comment)(walking sticks)  Tub or Shower Type: Shower        EXAMINATION OF PERFORMANCE DEFICITS:  Cognitive/Behavioral Status:  Neurologic State: Alert  Orientation Level: Oriented X4  Cognition: Follows commands             Skin: intact    Edema: none noted    Hearing:       Vision/Perceptual:                                     Range of Motion:    AROM: Generally decreased, functional                         Strength:    Strength: Generally decreased, functional                Coordination:  Coordination: Within functional limits  Fine Motor Skills-Upper: Left Intact; Right Intact    Gross Motor Skills-Upper: Left Intact; Right Intact    Tone & Sensation:    Tone: Normal  Sensation: Intact                      Balance:  Sitting: Intact  Standing: Intact    Functional Mobility and Transfers for ADLs:  Bed Mobility:  Supine to Sit: Modified independent    Transfers:  Sit to Stand: Stand-by assistance  Stand to Sit: Stand-by assistance  Bed to Chair: Contact guard assistance  Bathroom Mobility: Contact guard assistance  Toilet Transfer : Contact guard assistance    ADL Assessment:  Feeding: Independent    Oral Facial Hygiene/Grooming: Independent    Bathing: Stand-by assistance    Upper Body Dressing: Stand-by assistance    Lower Body Dressing: Modified independent; Additional time    Toileting: Stand by assistance                ADL Intervention and task modifications:           Functional Measure:  Barthel Index:    Bathin  Bladder: 10  Bowels: 10  Groomin  Dressin  Feeding: 10  Mobility: 10  Stairs: 5  Toilet Use: 10  Transfer (Bed to Chair and Back): 10  Total: 75/100        The Barthel ADL Index: Guidelines  1. The index should be used as a record of what a patient does, not as a record of what a patient could do. 2. The main aim is to establish degree of independence from any help, physical or verbal, however minor and for whatever reason. 3. The need for supervision renders the patient not independent. 4. A patient's performance should be established using the best available evidence. Asking the patient, friends/relatives and nurses are the usual sources, but direct observation and common sense are also important. However direct testing is not needed. 5. Usually the patient's performance over the preceding 24-48 hours is important, but occasionally longer periods will be relevant. 6. Middle categories imply that the patient supplies over 50 per cent of the effort. 7. Use of aids to be independent is allowed. Sana Gaming., Barthel, D.W. (). Functional evaluation: the Barthel Index. [address] [facility] (14)2. ELSIE GonzalezF, Diane Huang., Breana Rajan., [place], [address] [place] [address] (). Measuring the change indisability after inpatient rehabilitation; comparison of the responsiveness of the Barthel Index and Functional [place] Measure. Journal of Neurology, Neurosurgery, and Psychiatry, 66(4), 713-636. Gillian Edmond, [email or web address].[email or web address], SD Guardado, & Mandy Taylor M.A. (2004.) Assessment of post-stroke quality of life in cost-effectiveness studies: The usefulness of the Barthel Index and the EuroQoL-5D.  Quality of Life Research, 15, 449-40       Occupational Therapy Evaluation Charge Determination   History Examination Decision-Making   LOW Complexity : Brief history review  LOW Complexity : 1-3 performance deficits relating to physical, cognitive , or psychosocial skils that result in activity limitations and / or participation restrictions  LOW Complexity : No comorbidities that affect functional and no verbal or physical assistance needed to complete eval tasks       Based on the above components, the patient evaluation is determined to be of the following complexity level: LOW   Pain Rating:  No c/o pain    Activity Tolerance:   Fair  Please refer to the flowsheet for vital signs taken during this treatment. After treatment patient left in no apparent distress:    Sitting in chair    COMMUNICATION/EDUCATION:   The patients plan of care was discussed with: Physical therapist and Registered nurse.      Thank you for this referral.  Leonardo Vasquez OTR/L  Time Calculation: 27 mins

## 2020-08-18 NOTE — ROUTINE PROCESS
Attempted to schedule hospital PCP appt and was informed by the  that the nurse will have to call the patient. The nurse should be calling today, 8/18/2020, to schedule this appt. Hospital follow-up visit has been scheduled with [facility] for 8/21/2020.   Office will contact patient prior to arrival.  Arianne Valiente, Care Management Specialist.

## 2020-08-18 NOTE — PROGRESS NOTES
PHYSICAL THERAPY EVALUATION/DISCHARGE  Patient: Todd Martinez (88 y.o. female)  Date: 8/18/2020  Primary Diagnosis: UTI (urinary tract infection) [N39.0]       Precautions: Other (comment)(cervical)      ASSESSMENT  Based on the objective data described below, the patient presents with normal strength, full AROM (with exception of cervical, NT), and generally steady gait s/p admission for frequent falls and UTI. Patient recently underwent cervical fusion 2 weeks prior and reports new onset of falls in the past few days as well as hallucinations and confusion. At baseline, patient is indep with mobility and all self-care. Her  reports improvement in symptoms since admission and MD indicates belief patient's dizziness and unsteadiness likely due to combination of narcotic pain medication and muscle relaxers she has been taking since surgery, as well as the recent UTI. Today, she exhibited small LOB for which she corrected when turning around the door to leave her room but otherwise gait was steady but slightly guarded. Encouraged patient to continue to utilize her walking sticks at home for additional support until she feels back to her \"normal\". Provided education on proper collar fit, precautions, gait modifications with limited visual field due to collar. Patient cleared for d/c home with 's continued supervision but anticipate no further need for therapy if symptoms related to her medications. Will complete order. Functional Outcome Measure: The patient scored 75 on the Barthel outcome measure which is indicative of minimal difficulty with mobility or self care. Other factors to consider for discharge: supportive , indep PTA     Further skilled acute physical therapy is not indicated at this time.      PLAN :  Recommendation for discharge: (in order for the patient to meet his/her long term goals)  No skilled physical therapy/ follow up rehabilitation needs identified at this time. This discharge recommendation:  Has not yet been discussed the attending provider and/or case management    IF patient discharges home will need the following DME: walking sticks (owns)       SUBJECTIVE:   Patient stated I think its all of these medications.     OBJECTIVE DATA SUMMARY:   HISTORY:    Past Medical History:   Diagnosis Date    Anxiety and depression     Bipolar 2 disorder (HCC)     Cervical spine arthritis     Chronic cough     Chronic low back pain     GERD (gastroesophageal reflux disease)     High cholesterol     HTN (hypertension)     Obese     PHYLICIA (obstructive sleep apnea)     Not using CPAP     Past Surgical History:   Procedure Laterality Date    HX LAP CHOLECYSTECTOMY      HX LUMBAR LAMINECTOMY  1985    HX TUMOR REMOVAL      3 from uterus and one overy removed    IR INJ SINUS TRACT THERAP         Prior level of function: indep prior to cervical surgery, supervision of  since cervical fusion  Personal factors and/or comorbidities impacting plan of care: back pain    Home Situation  Home Environment: Private residence  # Steps to Enter: 3  Rails to Enter: No  One/Two Story Residence: Two story, live on 1st floor  Living Alone: No  Support Systems: Spouse/Significant Other/Partner  Patient Expects to be Discharged to[de-identified] Private residence  Current DME Used/Available at Home: Other (comment)(walking sticks)  Tub or Shower Type: Shower    EXAMINATION/PRESENTATION/DECISION MAKING:   Critical Behavior:  Neurologic State: Alert  Orientation Level: Oriented X4  Cognition: Follows commands     Hearing:       Range Of Motion:  AROM: Generally decreased, functional                       Strength:    Strength: Generally decreased, functional                    Tone & Sensation:   Tone: Normal              Sensation: Intact               Coordination:  Coordination: Within functional limits  Vision:      Functional Mobility:  Bed Mobility:     Supine to Sit: Modified independent        Transfers:  Sit to Stand: Stand-by assistance  Stand to Sit: Stand-by assistance        Bed to Chair: Contact guard assistance              Balance:   Sitting: Intact  Standing: Intact  Ambulation/Gait Training:  Distance (ft): 200 Feet (ft)  Assistive Device: Gait belt  Ambulation - Level of Assistance: Stand-by assistance        Gait Abnormalities: Decreased step clearance              Speed/Antonia: Fluctuations  Step Length: Left shortened;Right shortened               Functional Measure:  Barthel Index:    Bathin  Bladder: 10  Bowels: 10  Groomin  Dressin  Feeding: 10  Mobility: 10  Stairs: 5  Toilet Use: 10  Transfer (Bed to Chair and Back): 10  Total: 75/100       The Barthel ADL Index: Guidelines  1. The index should be used as a record of what a patient does, not as a record of what a patient could do. 2. The main aim is to establish degree of independence from any help, physical or verbal, however minor and for whatever reason. 3. The need for supervision renders the patient not independent. 4. A patient's performance should be established using the best available evidence. Asking the patient, friends/relatives and nurses are the usual sources, but direct observation and common sense are also important. However direct testing is not needed. 5. Usually the patient's performance over the preceding 24-48 hours is important, but occasionally longer periods will be relevant. 6. Middle categories imply that the patient supplies over 50 per cent of the effort. 7. Use of aids to be independent is allowed. Irena Mane, Barthel, D.W. ([id number]). Functional evaluation: the Barthel Index. [address] [facility] (14)2. Miguelito Ryan melvin NELLY Cobos, Dmitriy Best, Aleksandr GrossHospital Corporation of America, [address] [place], [place] [address]ruth (). Measuring the change indisability after inpatient rehabilitation; comparison of the responsiveness of the Barthel Index and Functional [place] Measure.  Journal of Neurology, Neurosurgery, and Psychiatry, 66(4), 817-669. CLINT Eduardo, SD Guardado, & Christine Martin M.A. (2004.) Assessment of post-stroke quality of life in cost-effectiveness studies: The usefulness of the Barthel Index and the EuroQoL-5D. Quality of Life Research, 15, 807-58            Physical Therapy Evaluation Charge Determination   History Examination Presentation Decision-Making   HIGH Complexity :3+ comorbidities / personal factors will impact the outcome/ POC  MEDIUM Complexity : 3 Standardized tests and measures addressing body structure, function, activity limitation and / or participation in recreation  LOW Complexity : Stable, uncomplicated  Other outcome measures Barthel 75/100  LOW       Based on the above components, the patient evaluation is determined to be of the following complexity level: LOW     Pain Rating:  None reported    Activity Tolerance:   Fair  Please refer to the flowsheet for vital signs taken during this treatment. After treatment patient left in no apparent distress:   in w/c with transport    COMMUNICATION/EDUCATION:   The patients plan of care was discussed with: Occupational therapist and Registered nurse. Fall prevention education was provided and the patient/caregiver indicated understanding.     Thank you for this referral.  Robinson Marks, PT   Time Calculation: 24 mins

## 2020-08-18 NOTE — PROGRESS NOTES
Bedside and Verbal shift change report given to Erik Euceda (oncoming nurse) by CA (offgoing nurse). Report included the following information SBAR, Kardex, Intake/Output, MAR and Recent Results.

## 2020-08-18 NOTE — PROGRESS NOTES
Case Management Note      Consult for discharge planning noted. Patient is ready for discharge with no needs. Family will assist at discharge.     Care Management Interventions  Transition of Care Consult (CM Consult): Discharge Planning  Discharge Location  Discharge Placement: Home  [facility]

## 2020-08-20 LAB
BACTERIA SPEC CULT: ABNORMAL
CC UR VC: ABNORMAL
SERVICE CMNT-IMP: ABNORMAL

## 2020-08-22 LAB
BACTERIA SPEC CULT: NORMAL
SERVICE CMNT-IMP: NORMAL

## 2020-08-24 NOTE — DISCHARGE SUMMARY
DISCHARGE SUMMARY     Patient: Jersey Yates                             Medical Record Number: [id number]                : 1960  Age: 64 y.o. Admit Date: 8/3/2020  Discharge Date: 2020  Admission Diagnosis: Cervical spondylosis with myelopathy [M47.12]  Cervical stenosis of spinal canal [M48.02]  Discharge Diagnosis: Cervical spondylosis with myelopathy [M47.12]  Procedures: Procedure(s):  C5-C7 ANTERIOR CERVICAL DISCECTOMY FUSION WITH INSTRUMENTATION  Surgeon: Sterling Barron MD  Anesthesia: General  Complications: None     History of Present Illness:  Jersey Yates is a 64 y.o. female with a history of intractable neck pain and radiculopathy. Despite conservative management and after clinical and radiographic evaluation, it was determined that she suffered from cervical stenosis and would benefit from Procedure(s):  C5-C7 ANTERIOR CERVICAL DISCECTOMY FUSION WITH INSTRUMENTATION, which she consented to undergo after a discussion of the risks, benefits, alternatives, rehab concerns, and potential complications of surgery. Hospital Course:  Jersey Yates tolerated the procedure well. She was transferred  to the recovery room in stable condition. After a brief stay, the patient was then transferred to the Orthopedic floor. On postoperative day #1, the dressing was clean and dry and she was neurovascularly intact. The patient was afebrile and vital signs were stable. Jersey Yates was deemed able to be discharged to Home  in stable condition on postoperative day 1. She was provided with routine postoperative instructions and advised to follow up in my office in 3 weeks following discharge from the hospital.  She was given a brace and prescriptions for medication to control post-operative symptoms.     Discharge Medications:  Discharge Medication List as of 2020 12:15 PM      START taking these medications    Details   oxyCODONE IR (ROXICODONE) 5 mg immediate release tablet Take 1-2 Tabs by mouth every six (6) hours as needed for Pain for up to 7 days. Max Daily Amount: 40 mg. Indications: pain, Normal, Disp-50 Tab,R-0S/p Orthopaedic Surgery. Call [phone] with questions. docusate sodium (COLACE) 100 mg capsule Take 1 Cap by mouth two (2) times a day., Normal, Disp-60 Cap,R-0         CONTINUE these medications which have CHANGED    Details   cyclobenzaprine (FLEXERIL) 10 mg tablet Take 1 Tab by mouth three (3) times daily as needed for Muscle Spasm(s). , Normal, Disp-30 Tab,R-0         CONTINUE these medications which have NOT CHANGED    Details   cholecalciferol (Vitamin D3) (5000 Units/125 mcg) tab tablet Take 1 Tab by mouth daily for 30 days. Indications: low vitamin D levels, Normal, Disp-30 Tab,R-0      buPROPion XL (WELLBUTRIN XL) 300 mg XL tablet Take 300 mg by mouth every morning., Historical Med      citalopram (CELEXA) 40 mg tablet Take 40 mg by mouth every morning., Historical Med      lamoTRIgine (LaMICtal) 100 mg tablet Take 100 mg by mouth every morning., Historical Med      simvastatin (ZOCOR) 40 mg tablet Take 40 mg by mouth nightly., Historical Med      nebivoloL (Bystolic) 5 mg tablet Take 5 mg by mouth nightly., Historical Med      multivitamin (ONE A DAY) tablet Take 1 Tab by mouth every morning., Historical Med      lisinopril-hydroCHLOROthiazide (PRINZIDE, ZESTORETIC) 20-25 mg per tablet Take 1 Tab by mouth every morning., Historical Med      omeprazole (PRILOSEC) 20 mg capsule Take 20 mg by mouth daily (with dinner). , Historical Med         STOP taking these medications       celecoxib (CeleBREX) 200 mg capsule Comments:   Reason for Stopping:               Corey Salinas  8/4/2020  Orthopaedic Surgery  Physician Assistant to Dr. Stephani Liu

## 2020-08-31 NOTE — DISCHARGE SUMMARY
Physician Discharge Summary     Patient ID:  Anna   [id number]  61 y.o.  1960    Admit date: 8/17/2020    Discharge date and time: 8/18/2020     Admission Diagnoses: UTI (urinary tract infection) [N39.0]    Discharge Diagnoses/Hospital Course   UTI (urinary tract infection) - POA, based on UA. NOT septic. Cultures with Klebsiella. Susceptible to Ceftriaxone      Ataxia / Cervical stenosis of spinal canal / Chronic low back pain - s/p recent surgery. Pt was taking oxycodone and flexeril around the clock and scheduled. Advised to only use these PRN and likely contributing to AMS     Acute metabolic encephalopathy / Bipolar 2 disorder / Anxiety and depression / Polypharmacy - Polypharmacy (narcotics and flexeril) and her underlying psychiatric medications (bupropion, citalopram, lamotrigine) contributing to falls. S/p PT/OT eval while in house. Hyponatremia - Likely mild dehydration vs the effects of SSRI. Sodium 129 at discharge. If worsens may need to consider stopping SSRI. Stop HCTZ      Obese / PHYLICIA (obstructive sleep apnea) - Not using CPAP. Does have borderline low systolic function on TTE      HTN (hypertension) - HCTZ held due to hyponatremia.  Remains on ACE I and Bystolic.      Chronic cough - ?2/2 ACE I      GERD (gastroesophageal reflux disease) - PPI     High cholesterol - continue simvastatin    PCP: Chaya Reyes MD     Consults: Neurology    Discharge Exam:  Visit Vitals  /84   Pulse 88   Temp 98.1 °F (36.7 °C)   Resp 18   Ht 5' 7\" (1.702 m)   Wt 89.4 kg (197 lb 1.5 oz)   SpO2 98%   BMI 30.87 kg/m²     Gen:  Obese, in no acute distress  HEENT:  Pink conjunctivae, PERRL, hearing intact to voice, moist mucous membranes  Neck:  Supple, without masses, thyroid non-tender  Resp:  No accessory muscle use, clear breath sounds without wheezes rales or rhonchi  Card:  No murmurs, tachycardic S1, S2 without thrills, bruits or peripheral edema  Abd:  Soft, non-tender, non-distended, normoactive bowel sounds are present, no mass  Lymph:  No cervical or inguinal adenopathy  Musc:  No cyanosis or clubbing  Skin:  No rashes or ulcers, skin turgor is good  Neuro:  Cranial nerves are grossly intact, general motor weakness, follows commands   Psych: Moderate insight, oriented to person, place and time, alert     Disposition: home    Patient Instructions:   Discharge Medication List as of 8/18/2020  3:51 PM      START taking these medications    Details   cefdinir (OMNICEF) 300 mg capsule Take 1 Cap by mouth two (2) times a day for 5 days. , Print, Disp-10 Cap,R-0      lisinopriL (PRINIVIL, ZESTRIL) 20 mg tablet Take 1 Tab by mouth daily. , Print, Disp-30 Tab,R-0      acetaminophen (TylenoL) 325 mg tablet Take 2 Tabs by mouth every six (6) hours. , No Print, Disp-30 Tab,R-0         CONTINUE these medications which have CHANGED    Details   cyclobenzaprine (FLEXERIL) 10 mg tablet Take 1 Tab by mouth three (3) times daily as needed for Muscle Spasm(s) for up to 30 days. , Print, Disp-15 Tab,R-0      oxyCODONE IR (ROXICODONE) 5 mg immediate release tablet Take 1 Tab by mouth every four (4) hours as needed for Pain for up to 5 days. Max Daily Amount: 30 mg., Print, Disp-10 Tab,R-0         CONTINUE these medications which have NOT CHANGED    Details   cholecalciferol (Vitamin D3) (5000 Units/125 mcg) tab tablet Take 1 Tab by mouth daily for 30 days.  Indications: low vitamin D levels, Normal, Disp-30 Tab,R-0      buPROPion XL (WELLBUTRIN XL) 300 mg XL tablet Take 300 mg by mouth every morning., Historical Med      citalopram (CELEXA) 40 mg tablet Take 40 mg by mouth every morning., Historical Med      lamoTRIgine (LaMICtal) 100 mg tablet Take 100 mg by mouth every morning., Historical Med      simvastatin (ZOCOR) 40 mg tablet Take 40 mg by mouth nightly., Historical Med      nebivoloL (Bystolic) 5 mg tablet Take 5 mg by mouth nightly., Historical Med      multivitamin (ONE A DAY) tablet Take 1 Tab by mouth every morning., Historical Med         STOP taking these medications       lisinopril-hydroCHLOROthiazide (PRINZIDE, ZESTORETIC) 20-25 mg per tablet Comments:   Reason for Stopping:         omeprazole (PRILOSEC) 20 mg capsule Comments:   Reason for Stopping:             Activity: Activity as tolerated  Diet: Resume previous diet  Wound Care: None needed    Follow-up with Brian Blackwell MD in one week as needed     Approximate time spent in patient care on day of discharge: 34 minutes     Signed:  Cinthia Urban MD  8/31/2020  1:11 AM

## 2022-05-06 ENCOUNTER — TRANSCRIBE ORDER (OUTPATIENT)
Dept: SCHEDULING | Age: 62
End: 2022-05-06

## 2022-05-06 DIAGNOSIS — M54.16 LUMBAR RADICULITIS: Primary | ICD-10-CM

## 2022-05-13 ENCOUNTER — HOSPITAL ENCOUNTER (OUTPATIENT)
Dept: MRI IMAGING | Age: 62
Discharge: HOME | End: 2022-05-13
Attending: ORTHOPAEDIC SURGERY
Payer: COMMERCIAL

## 2022-05-13 DIAGNOSIS — M54.16 LUMBAR RADICULITIS: ICD-10-CM

## 2022-05-13 PROCEDURE — 72148 MRI LUMBAR SPINE W/O DYE: CPT

## 2022-07-14 ENCOUNTER — TRANSCRIBE ORDER (OUTPATIENT)
Dept: SCHEDULING | Age: 62
End: 2022-07-14

## 2022-07-14 DIAGNOSIS — M54.12 CERVICAL RADICULITIS: Primary | ICD-10-CM

## 2022-07-20 ENCOUNTER — HOSPITAL ENCOUNTER (OUTPATIENT)
Dept: MRI IMAGING | Age: 62
Discharge: HOME | End: 2022-07-20
Attending: ORTHOPAEDIC SURGERY
Payer: COMMERCIAL

## 2022-07-20 DIAGNOSIS — M54.12 CERVICAL RADICULITIS: ICD-10-CM

## 2022-07-20 PROCEDURE — 72141 MRI NECK SPINE W/O DYE: CPT

## 2023-03-28 ENCOUNTER — TRANSCRIBE ORDER (OUTPATIENT)
Dept: SCHEDULING | Age: 63
End: 2023-03-28

## 2023-03-28 DIAGNOSIS — M75.102 NONTRAUMATIC TEAR OF LEFT ROTATOR CUFF, UNSPECIFIED TEAR EXTENT: Primary | ICD-10-CM

## 2023-04-05 ENCOUNTER — TRANSCRIBE ORDER (OUTPATIENT)
Dept: SCHEDULING | Age: 63
End: 2023-04-05

## (undated) DEVICE — BONE WAX WHITE: Brand: BONE WAX WHITE

## (undated) DEVICE — ALCOHOL RUBBING ISO 16OZ 70%

## (undated) DEVICE — 3M™ IOBAN™ 2 ANTIMICROBIAL INCISE DRAPE 6650EZ: Brand: IOBAN™ 2

## (undated) DEVICE — STERILE POLYISOPRENE POWDER-FREE SURGICAL GLOVES: Brand: PROTEXIS

## (undated) DEVICE — KIT EVAC 0.13IN RECT TB DIA10FR 400CC PVC 3 SPR Y CONN DRN

## (undated) DEVICE — TOOL 14MH30 LEGEND 14CM 3MM: Brand: MIDAS REX ™

## (undated) DEVICE — REM POLYHESIVE ADULT PATIENT RETURN ELECTRODE: Brand: VALLEYLAB

## (undated) DEVICE — PREP CHLORAPRP 10.5ML ORG STRL --

## (undated) DEVICE — SEALANT HEMOSTAT W/THROM 8ML -- SURGIFLO MATRIX

## (undated) DEVICE — INFECTION CONTROL KIT SYS

## (undated) DEVICE — COVER,MAYO STAND,XL,STERILE: Brand: MEDLINE

## (undated) DEVICE — BIT DRL L12MM DIA2.3MM CERV STP W/ EPOXY RNG DISP PYRENEES

## (undated) DEVICE — SPONGE GZ W4XL4IN COT 12 PLY TYP VII WVN C FLD DSGN

## (undated) DEVICE — STERILE POLYISOPRENE POWDER-FREE SURGICAL GLOVES WITH EMOLLIENT COATING: Brand: PROTEXIS

## (undated) DEVICE — 1010 S-DRAPE TOWEL DRAPE 10/BX: Brand: STERI-DRAPE™

## (undated) DEVICE — 3M™ TEGADERM™ TRANSPARENT FILM DRESSING FRAME STYLE, 1624W, 2-3/8 IN X 2-3/4 IN (6 CM X 7 CM), 100/CT 4CT/CASE: Brand: 3M™ TEGADERM™

## (undated) DEVICE — PACK,BASIC,SIRUS,V: Brand: MEDLINE

## (undated) DEVICE — COVER LT HNDL PLAS RIG 1 PER PK

## (undated) DEVICE — DRAPE MICSCP W46XL120IN FOR ZEISS MD FEATURING CLEARLENS

## (undated) DEVICE — SOLUTION IV 1000ML 0.9% SOD CHL

## (undated) DEVICE — PAD,NON-ADHERENT,3X8,STERILE,LF,1/PK: Brand: MEDLINE

## (undated) DEVICE — ROCKER SWITCH PENCIL BLADE ELECTRODE, HOLSTER: Brand: EDGE

## (undated) DEVICE — NDL SPNE QNCKE 18GX3.5IN LF --

## (undated) DEVICE — SURGICAL PROCEDURE PACK BASIN MAJ SET CUST NO CAUT

## (undated) DEVICE — ACCY PA100-A LEGEND LUB/DIFFUSER 4 PACK: Brand: MIDAS REX®

## (undated) DEVICE — CANISTER, RIGID, 3000CC: Brand: MEDLINE INDUSTRIES, INC.

## (undated) DEVICE — SUTURE VCRL SZ 3-0 L27IN ABSRB UD L26MM SH 1/2 CIR J416H

## (undated) DEVICE — DRAPE XR C ARM 41X74IN LF --

## (undated) DEVICE — 450 ML BOTTLE OF 0.05% CHLORHEXIDINE GLUCONATE IN 99.95% STERILE WATER FOR IRRIGATION, USP AND APPLICATOR.: Brand: IRRISEPT ANTIMICROBIAL WOUND LAVAGE

## (undated) DEVICE — STRIP,CLOSURE,WOUND,MEDI-STRIP,1/2X4: Brand: MEDLINE

## (undated) DEVICE — STRAP,POSITIONING,KNEE/BODY,FOAM,4X60": Brand: MEDLINE

## (undated) DEVICE — COVER,MAYO STAND,STERILE: Brand: MEDLINE

## (undated) DEVICE — SPONGE: SPECIALTY PEANUT XR 100/CS: Brand: MEDICAL ACTION INDUSTRIES

## (undated) DEVICE — INSULATED BLADE ELECTRODE: Brand: EDGE

## (undated) DEVICE — MAGNETIC INSTR DRAPE 20X16: Brand: MEDLINE INDUSTRIES, INC.

## (undated) DEVICE — DRAPE,UTILITY,TAPE,15X26,STERILE: Brand: MEDLINE

## (undated) DEVICE — SOLUTION IRRIG 1000ML H2O STRL BLT

## (undated) DEVICE — DRAPE,REIN 53X77,STERILE: Brand: MEDLINE

## (undated) DEVICE — TAPE,CLOTH/SILK,CURAD,3"X10YD,LF,40/CS: Brand: CURAD

## (undated) DEVICE — TIP CLEANER: Brand: VALLEYLAB

## (undated) DEVICE — BIPOLAR FORCEPS CORD: Brand: VALLEYLAB

## (undated) DEVICE — CATHETER IV 14GA L1.25IN TEF STR HUB INTROCAN SFTY

## (undated) DEVICE — SYR 20ML LL STRL LF --

## (undated) DEVICE — CODMAN® SURGICAL PATTIES 3/4" X 3/4" (1.91CM X 1.91CM): Brand: CODMAN®

## (undated) DEVICE — 3M™ TEGADERM™ TRANSPARENT FILM DRESSING FRAME STYLE, 1626W, 4 IN X 4-3/4 IN (10 CM X 12 CM), 50/CT 4CT/CASE: Brand: 3M™ TEGADERM™

## (undated) DEVICE — COVER,TABLE,60X90,STERILE: Brand: MEDLINE

## (undated) DEVICE — SYR 5ML 1/5 GRAD LL NSAF LF --

## (undated) DEVICE — SUTURE MCRYL SZ 4-0 L27IN ABSRB UD L19MM PS-2 1/2 CIR PRIM Y426H

## (undated) DEVICE — DRAPE,CHEST,FENES,15X10,STERIL: Brand: MEDLINE

## (undated) DEVICE — GOWN,SIRUS,NONRNF,SETINSLV,XL,20/CS: Brand: MEDLINE

## (undated) DEVICE — TUBING, SUCTION, 1/4" X 10', STRAIGHT: Brand: MEDLINE